# Patient Record
Sex: FEMALE | Race: WHITE | Employment: FULL TIME | URBAN - METROPOLITAN AREA
[De-identification: names, ages, dates, MRNs, and addresses within clinical notes are randomized per-mention and may not be internally consistent; named-entity substitution may affect disease eponyms.]

---

## 2019-02-25 ENCOUNTER — HOSPITAL ENCOUNTER (EMERGENCY)
Age: 24
Discharge: HOME | End: 2019-02-25
Attending: EMERGENCY MEDICINE
Payer: COMMERCIAL

## 2019-02-25 VITALS
TEMPERATURE: 98 F | SYSTOLIC BLOOD PRESSURE: 135 MMHG | DIASTOLIC BLOOD PRESSURE: 83 MMHG | BODY MASS INDEX: 35.88 KG/M2 | HEART RATE: 95 BPM | WEIGHT: 195 LBS | OXYGEN SATURATION: 99 % | HEIGHT: 62 IN | RESPIRATION RATE: 16 BRPM

## 2019-02-25 DIAGNOSIS — N39.0 URINARY TRACT INFECTION WITHOUT HEMATURIA, SITE UNSPECIFIED: Primary | ICD-10-CM

## 2019-02-25 LAB
-: ABNORMAL
AMORPHOUS: ABNORMAL
BACTERIA: ABNORMAL
BILIRUBIN URINE: ABNORMAL
CASTS UA: ABNORMAL /LPF
COLOR: ABNORMAL
COMMENT UA: ABNORMAL
CRYSTALS, UA: ABNORMAL /HPF
EPITHELIAL CELLS UA: ABNORMAL /HPF (ref 0–5)
GLUCOSE URINE: ABNORMAL
HCG(URINE) PREGNANCY TEST: NEGATIVE
KETONES, URINE: ABNORMAL
LEUKOCYTE ESTERASE, URINE: NEGATIVE
MUCUS: ABNORMAL
NITRITE, URINE: POSITIVE
OTHER OBSERVATIONS UA: ABNORMAL
PH UA: 5 (ref 5–8)
PROTEIN UA: ABNORMAL
RBC UA: ABNORMAL /HPF (ref 0–2)
RENAL EPITHELIAL, UA: ABNORMAL /HPF
SPECIFIC GRAVITY UA: 1.02 (ref 1–1.03)
TRICHOMONAS: ABNORMAL
TURBIDITY: CLEAR
URINE HGB: NEGATIVE
UROBILINOGEN, URINE: ABNORMAL
WBC UA: ABNORMAL /HPF (ref 0–5)
YEAST: ABNORMAL

## 2019-02-25 PROCEDURE — 84703 CHORIONIC GONADOTROPIN ASSAY: CPT

## 2019-02-25 PROCEDURE — 87086 URINE CULTURE/COLONY COUNT: CPT

## 2019-02-25 PROCEDURE — 99283 EMERGENCY DEPT VISIT LOW MDM: CPT

## 2019-02-25 PROCEDURE — 81001 URINALYSIS AUTO W/SCOPE: CPT

## 2019-02-25 RX ORDER — TRAZODONE HYDROCHLORIDE 50 MG/1
100 TABLET ORAL NIGHTLY
COMMUNITY
End: 2021-05-03

## 2019-02-25 RX ORDER — NITROFURANTOIN 25; 75 MG/1; MG/1
100 CAPSULE ORAL 2 TIMES DAILY
Qty: 20 CAPSULE | Refills: 0 | Status: SHIPPED | OUTPATIENT
Start: 2019-02-25 | End: 2019-03-07

## 2019-02-25 ASSESSMENT — ENCOUNTER SYMPTOMS
ABDOMINAL PAIN: 0
VOMITING: 0
COUGH: 0
SHORTNESS OF BREATH: 0
NAUSEA: 0
SORE THROAT: 0
BACK PAIN: 0

## 2019-02-26 LAB
CULTURE: NORMAL
Lab: NORMAL
SPECIMEN DESCRIPTION: NORMAL

## 2019-12-30 ENCOUNTER — HOSPITAL ENCOUNTER (EMERGENCY)
Age: 24
Discharge: HOME | End: 2019-12-30
Attending: EMERGENCY MEDICINE
Payer: COMMERCIAL

## 2019-12-30 VITALS
RESPIRATION RATE: 16 BRPM | HEART RATE: 91 BPM | DIASTOLIC BLOOD PRESSURE: 92 MMHG | HEIGHT: 62 IN | BODY MASS INDEX: 36.8 KG/M2 | TEMPERATURE: 98.1 F | WEIGHT: 200 LBS | SYSTOLIC BLOOD PRESSURE: 141 MMHG | OXYGEN SATURATION: 100 %

## 2019-12-30 PROCEDURE — 99283 EMERGENCY DEPT VISIT LOW MDM: CPT

## 2019-12-30 RX ORDER — CEPHALEXIN 250 MG/1
250 CAPSULE ORAL 3 TIMES DAILY
Qty: 30 CAPSULE | Refills: 0 | Status: SHIPPED | OUTPATIENT
Start: 2019-12-30 | End: 2020-07-22 | Stop reason: ALTCHOICE

## 2019-12-30 ASSESSMENT — PAIN DESCRIPTION - PAIN TYPE: TYPE: ACUTE PAIN

## 2019-12-30 ASSESSMENT — PAIN SCALES - GENERAL: PAINLEVEL_OUTOF10: 3

## 2019-12-30 ASSESSMENT — PAIN DESCRIPTION - ORIENTATION: ORIENTATION: LEFT

## 2019-12-30 ASSESSMENT — PAIN DESCRIPTION - LOCATION: LOCATION: EAR

## 2019-12-30 NOTE — ED PROVIDER NOTES
never smoked. She uses smokeless tobacco. She reports that she does not drink alcohol or use drugs. PHYSICAL EXAM     INITIAL VITALS:  height is 5' 2\" (1.575 m) and weight is 90.7 kg (200 lb). Her oral temperature is 98.1 °F (36.7 °C). Her blood pressure is 141/92 (abnormal) and her pulse is 91. Her respiration is 16 and oxygen saturation is 100%. Constitutional: The patient is alert, well-developed, in no acute distress. Vital signs as noted. Eyes: Pupils equal and reactive to light. EOMI. Ears, nose, and throat: Oropharynx clear without masses, lesions or deformities. Right TM demonstrates some fluid behind the eardrum but no redness or bulging. The left TM is mildly reddened and also shows some fluid behind the eardrum. EACs are normal.  Neck: No masses, trachea midline. DIFFERENTIAL DIAGNOSIS/ MEDICAL DECISION MAKING:      The ears are irrigated free of any wax in the patient's hearing is returned to normal levels. CT brain is negative for acute process. Follow Exit Care instructions closely. I have reviewed the disposition diagnosis with the patient and or their family/guardian. I have answered their questions and given discharge instructions. They voiced understanding of these instructions and did not have any further questions or complaints. DIAGNOSTIC RESULTS     RADIOLOGY:   Non-plain film images such as CT, Ultrasound and MRI are read by the radiologist. Plain radiographic images are visualized and preliminarily interpreted by the emergency physician with the below findings:  No orders to display         LABS:  No results found for this visit on 12/30/19.     ABNORMAL LABS:  Labs Reviewed - No data to display           EMERGENCY DEPARTMENT COURSE:   Vitals:    Vitals:    12/30/19 1845   BP: (!) 141/92   Pulse: 91   Resp: 16   Temp: 98.1 °F (36.7 °C)   TempSrc: Oral   SpO2: 100%   Weight: 90.7 kg (200 lb)   Height: 5' 2\" (1.575 m)     -------------------------  BP: (!) 141/92, Temp: 98.1 °F (36.7 °C), Pulse: 91, Resp: 16    See DDX/MDM  (Differential Diagnosis/Medical Decision Making) above. FINAL IMPRESSION      1. Non-recurrent acute suppurative otitis media of left ear without spontaneous rupture of tympanic membrane    2.  Dysfunction of Eustachian tube, unspecified laterality          DISPOSITION/PLAN   DISPOSITION Decision To Discharge 12/30/2019 06:55:45 PM      Condition on Disposition    Fair    PATIENT REFERRED TO:  MD Daniela Gasca\[facility] [place] [facility]\"" [address], [facility]  [phone]    In 2 days        DISCHARGE MEDICATIONS:  Discharge Medication List as of 12/30/2019  7:01 PM      START taking these medications    Details   cephALEXin (KEFLEX) 250 MG capsule Take 1 capsule by mouth 3 times daily, Disp-30 capsule, R-0Print             (Please note that portions of this note were completed with a voice recognition program.  Efforts were made to edit the dictations but occasionally words are mis-transcribed.)    Daisy Fontenot,, DO  Attending Emergency Physician       [address] [place] [address],   12/31/19 8090

## 2020-07-22 ENCOUNTER — HOSPITAL ENCOUNTER (EMERGENCY)
Age: 25
Discharge: HOME | End: 2020-07-22
Attending: EMERGENCY MEDICINE
Payer: COMMERCIAL

## 2020-07-22 VITALS
TEMPERATURE: 98.2 F | OXYGEN SATURATION: 99 % | WEIGHT: 200 LBS | DIASTOLIC BLOOD PRESSURE: 74 MMHG | SYSTOLIC BLOOD PRESSURE: 114 MMHG | HEIGHT: 62 IN | BODY MASS INDEX: 36.8 KG/M2 | HEART RATE: 83 BPM | RESPIRATION RATE: 14 BRPM

## 2020-07-22 PROCEDURE — 99282 EMERGENCY DEPT VISIT SF MDM: CPT

## 2020-07-22 PROCEDURE — 69200 CLEAR OUTER EAR CANAL: CPT

## 2020-07-22 RX ORDER — ARIPIPRAZOLE 15 MG/1
15 TABLET ORAL DAILY
COMMUNITY
End: 2021-05-03 | Stop reason: ALTCHOICE

## 2020-07-22 RX ORDER — SERTRALINE HYDROCHLORIDE 25 MG/1
25 TABLET, FILM COATED ORAL DAILY
COMMUNITY
End: 2021-05-08 | Stop reason: ALTCHOICE

## 2020-07-22 ASSESSMENT — PAIN - FUNCTIONAL ASSESSMENT: PAIN_FUNCTIONAL_ASSESSMENT: ACTIVITIES ARE NOT PREVENTED

## 2020-07-22 ASSESSMENT — PAIN DESCRIPTION - ONSET: ONSET: GRADUAL

## 2020-07-22 ASSESSMENT — PAIN DESCRIPTION - ORIENTATION: ORIENTATION: LEFT

## 2020-07-22 ASSESSMENT — PAIN SCALES - GENERAL: PAINLEVEL_OUTOF10: 2

## 2020-07-22 ASSESSMENT — PAIN DESCRIPTION - LOCATION: LOCATION: EAR

## 2020-07-22 ASSESSMENT — PAIN DESCRIPTION - PAIN TYPE: TYPE: ACUTE PAIN

## 2020-07-22 ASSESSMENT — PAIN DESCRIPTION - PROGRESSION: CLINICAL_PROGRESSION: NOT CHANGED

## 2020-07-22 ASSESSMENT — PAIN DESCRIPTION - FREQUENCY: FREQUENCY: INTERMITTENT

## 2020-07-22 NOTE — ED PROVIDER NOTES
[address] [facility] ED  [address] [facility] [address]  Phone: [phone]  Fax: [phone]      Pt Name: Rosey Mandel  [id number]  [place] 1995  Date of evaluation: 7/22/2020      CHIEF COMPLAINT       Chief Complaint   Patient presents with    Foreign Body in Ear     Ear plug stuck in left ear. Attempted to remove but unsuccessful. HISTORY OF PRESENT ILLNESS   Rosey Mandel is a 22 y.o. female who presents for evaluation of an ear plug stuck in her left ear. The patient reports that she placed a silicone ear plug in her left ear around 1:30 AM.  She states that she states that she has been unable to remove the ear plug and has attempted to pull it out with tweezers but it ultimately pushed back further and broken apart into pieces. She states that her brother is a nurse and tried to pull it out with hemostats but was unsuccessful. The patient complains of decreased hearing to the left ear and slight dizziness. She denies any drainage from the left ear. The patient has not taken any medications for pain and does not list any provoking or palliating factors. She denies any previous surgery to the left ear. The patient denies fever, chills, headache, vision changes, neck pain, back pain, chest pain, shortness of breath, abdominal pain, urinary/bowel symptoms, focal weakness, numbness, tingling, recent injury or illness. REVIEW OF SYSTEMS     Ten point review of systems was reviewed and is negative unless otherwise noted in the HPI    [address]    has a past medical history of Acid reflux, Anemia, Depression, Irritable bowel syndrome, and Kidney stone. SURGICAL HISTORY      has no past surgical history on file.   Patient denies    CURRENT MEDICATIONS       Previous Medications    ARIPIPRAZOLE (ABILIFY) 15 MG TABLET    Take 15 mg by mouth daily    BUSPIRONE HCL PO    Take by mouth 3 times daily    MIRABEGRON (MYRBETRIQ) 50 MG TB24    Take 50 mg by mouth daily SERTRALINE (ZOLOFT) 25 MG TABLET    Take 25 mg by mouth daily    SERTRALINE (ZOLOFT) 50 MG TABLET    Take 50 mg by mouth daily    TRAZODONE (DESYREL) 50 MG TABLET    Take 100 mg by mouth nightly        ALLERGIES     is allergic to bactrim [sulfamethoxazole-trimethoprim]. FAMILY HISTORY     She indicated that her mother is alive. She indicated that her father is alive. She indicated that the status of her maternal grandmother is unknown. She indicated that the status of her paternal grandfather is unknown.     family history includes Arthritis in her mother; Cancer in her paternal grandfather; Diabetes in her father and maternal grandmother. SOCIAL HISTORY      reports that she has never smoked. Her smokeless tobacco use includes chew. She reports that she does not drink alcohol or use drugs. I counseled the patient against using tobacco products. PHYSICAL EXAM     INITIAL VITALS:  height is 5' 2\" (1.575 m) and weight is 90.7 kg (200 lb). Her oral temperature is 98.2 °F (36.8 °C). Her blood pressure is 114/74 and her pulse is 83. Her respiration is 14 and oxygen saturation is 99%. CONSTITUTIONAL: no apparent distress, well appearing  SKIN: warm, dry, no jaundice, hives or petechiae  EYES: clear conjunctiva, non-icteric sclera  HENT: normocephalic, atraumatic, moist mucus membranes, there is a blue silicone earplug lodged deeply in the left ear canal that is pushing up against the tympanic membrane. There are small abrasions noted within the left ear canal without any active bleeding. Once the foreign body was removed the left TM was visualized and is intact without signs of infection. The right external ear canal and TM appear normal  NECK: Nontender and supple with no nuchal rigidity, full range of motion  PULMONARY: clear to auscultation without wheezes, rhonchi, or rales, normal excursion, no accessory muscle use and no stridor  CARDIOVASCULAR: regular rate, rhythm.  Strong radial pulses with intact distal perfusion. Capillary refill <2 seconds. GASTROINTESTINAL: soft, non-tender, non-distended, no palpable masses, no rebound or guarding   GENITOURINARY: No costovertebral angle tenderness to palpation  MUSCULOSKELETAL: No midline spinal tenderness, step off or deformity. Extremities are otherwise nontender to palpation and nonerythematous. Compartments soft. No peripheral edema. NEUROLOGIC: alert and oriented x 3, GCS 15, normal mentation and speech. Moves all extremities x 4 without motor or sensory deficit, gait is stable without ataxia  PSYCHIATRIC: normal mood and affect, thought process is clear and linear    DIAGNOSTIC RESULTS     EKG:  None    RADIOLOGY:   No results found. LABS:  No results found for this visit on 07/22/20. EMERGENCY DEPARTMENT COURSE:        The patient was given the following medications:  No orders of the defined types were placed in this encounter. Vitals:    Vitals:    07/22/20 0316   BP: 114/74   Pulse: 83   Resp: 14   Temp: 98.2 °F (36.8 °C)   TempSrc: Oral   SpO2: 99%   Weight: 90.7 kg (200 lb)   Height: 5' 2\" (1.575 m)     -------------------------  BP: 114/74, Temp: 98.2 °F (36.8 °C), Pulse: 83, Resp: 14    CONSULTS:  None    CRITICAL CARE:   None    PROCEDURES:  Ear Foreign Body Removal Procedure Note    Indication: foreign body in the left ear canal                   Procedure: During otoscopic evaluation a foreign body was visualized in the left ear which appeared to be blue silicon. The ear canal was anesthetized using tetracaine ophthalmic drops. The patient's head was positioned appropriately and the foreign body was removed in pieces using alligator forceps, sterile water flush, suction, and a scooping tool. The patient tolerated the procedure poorly and was delayed multiple times at the patient's request because she could not sit still.      Complications: mild external canal trauma      DIAGNOSIS/ MDM:   Erich Latham is a 22 y.o. female who presents with a ear plug in her left ear. Vital signs are stable. Heart regular rate and rhythm. Lungs clear to auscultation. Abdomen soft nontender. Right ear canal and TM appear normal.  Left ear canal shows signs of trauma with small abrasions. There was a silicone ear plug that was in small pieces within the left ear canal pushing up against the tympanic membrane. The patient delayed removing the foreign body multiple times because she cannot sit still. Multiple tools were eventually successful in removing all of the pieces. The left tympanic membrane is intact. No signs of infection or perforation. I instructed the patient to take ibuprofen or Tylenol as needed for pain and to follow-up with her PCP in 1 to 2 days for recheck. She was instructed to return to the ED for worsening symptoms or any other concern. The patient understands that at this time there is no evidence for a more malignant underlying process, but also understands that early in the process of an illness or injury, and emergency department work-up can be falsely reassuring. Routine discharge counseling was given, and the patient understands that worsening, changing or persistent symptoms should prompt a immediate call or follow-up with their primary care physician or return to the emergency department. The importance of appropriate follow-up was also discussed. I have reviewed the disposition diagnosis with the patient. I have answered their questions and given discharge instructions. They voiced understanding of these instructions and did not have any further questions or complaints. FINAL IMPRESSION      1.  Foreign body of left ear, initial encounter          DISPOSITION/PLAN   DISPOSITION          PATIENT REFERRED TO:  Wilburn Seip, [address] [place] [address],[address] [place] [address]  [phone]    Schedule an appointment as soon as possible for a visit in 2 days      [facility] ED  [address]

## 2020-11-11 ENCOUNTER — HOSPITAL ENCOUNTER (OUTPATIENT)
Age: 25
Setting detail: SPECIMEN
Discharge: HOME | End: 2020-11-11
Payer: COMMERCIAL

## 2020-11-11 ENCOUNTER — OFFICE VISIT (OUTPATIENT)
Dept: PRIMARY CARE CLINIC | Age: 25
End: 2020-11-11
Payer: COMMERCIAL

## 2020-11-11 VITALS
DIASTOLIC BLOOD PRESSURE: 66 MMHG | OXYGEN SATURATION: 98 % | SYSTOLIC BLOOD PRESSURE: 100 MMHG | HEIGHT: 62 IN | BODY MASS INDEX: 36.8 KG/M2 | RESPIRATION RATE: 16 BRPM | TEMPERATURE: 97.9 F | WEIGHT: 200 LBS | HEART RATE: 77 BPM

## 2020-11-11 PROCEDURE — 99214 OFFICE O/P EST MOD 30 MIN: CPT | Performed by: NURSE PRACTITIONER

## 2020-11-11 ASSESSMENT — PATIENT HEALTH QUESTIONNAIRE - PHQ9
SUM OF ALL RESPONSES TO PHQ QUESTIONS 1-9: 0
1. LITTLE INTEREST OR PLEASURE IN DOING THINGS: 0
SUM OF ALL RESPONSES TO PHQ QUESTIONS 1-9: 0
SUM OF ALL RESPONSES TO PHQ QUESTIONS 1-9: 0
2. FEELING DOWN, DEPRESSED OR HOPELESS: 0
SUM OF ALL RESPONSES TO PHQ9 QUESTIONS 1 & 2: 0

## 2020-11-11 NOTE — PATIENT INSTRUCTIONS
You were tested for COVID today. We will call you with results when they are available. If you have not heard from us in 7 days, please call our office. Patient was evaluated [place] today during this pandemic covid 19 situation. Quarantine as directed  Await results  Increase fluids- stay hydrated  Good handwashing  Supportive care as discussed    If having symptoms- you need to be fever free for 24 hours, other symptoms resolved and at least 10 days passed since first symptom appeared before discontinuing  quarantine- CDC guidelines    tylenol as directed as needed over the counter if able to take  go to the ER for chest pain, short of breath, hard time breathing, persistent vomiting, feeling weaker or dehydrated, any worsening, change or concern  Follow up with primary care for re evaluation  [place] covid 19 call center - [phone]- [phone]  Another good resource for information is coronavirus. [place].gov    If exposure, it is recommended 14 day quarantine    The COVID-19 test that was done today can take 1-6 days for results. Until then you should assume you have this disease and adhere to home isolation as described below. When we get the test results back, one of the following readings will be obtained. 1. A positive test means you have the virus. 2.  An inconclusive test means it wasn't sure if you have the virus or not. An inconclusive test result is treated as a positive result and recommendations  are the same as a positive test result. We may ask you to repeat this test in this circumstance. 3.  A negative test means you probably do not have the virus.       Prevention steps for People with positive or inconclusive test results or suspected  COVID-19 (including persons under investigation) who do not need to be hospitalized  and   People with confirmed COVID-19 who were hospitalized and determined to be medically stable to go home    Contacts who are NOT healthcare providers or first responders and are asymptomatic (no fever,  cough, shortness of breath, or difficulty breathing) should self-quarantine for 14 days from the last  date of exposure to confirmed or suspected COVID-19. Your healthcare provider and public health staff will evaluate whether you can be cared for at home. If it is determined that you do not need to be hospitalized and can be isolated at home, you will be monitored by staff from your health department. You should follow the prevention steps below until a healthcare provider or local or state health department says you can return to your normal activities. Stay home except to get medical care  People who are mildly ill with COVID-19 are able to isolate at home during their illness. You should restrict activities outside your home, except for getting medical care. Do not go to work, school, or public areas. Avoid using public transportation, ride-sharing, or taxis. Separate yourself from other people and animals in your home  People: As much as possible, you should stay in a specific room and away from other people in your home. Also, you should use a separate bathroom, if available. Animals: You should restrict contact with pets and other animals while you are sick with COVID-19, just like you would around other people. Although there have not been reports of pets or other animals becoming sick with COVID-19, it is still recommended that people sick with COVID-19 limit contact with animals until more information is known about the virus. When possible, have another member of your household care for your animals while you are sick. If you are sick with COVID-19, avoid contact with your pet, including petting, snuggling, being kissed or licked, and sharing food. If you must care for your pet or be around animals while you are sick, wash your hands before and after you interact with pets and wear a facemask.   Call ahead before visiting your doctor  If you have a medical appointment, call the healthcare provider and tell them that you have or may have COVID-19. This will help the healthcare providers office take steps to keep other people from getting infected or exposed. Wear a facemask  You should wear a facemask when you are around other people (e.g., sharing a room or vehicle) or pets and before you enter a healthcare providers office. If you are not able to wear a facemask (for example, because it causes trouble breathing), then people who live with you should not stay in the same room with you; they should also wear a facemask if they enter your room. Cover your coughs and sneezes  Cover your mouth and nose with a tissue when you cough or sneeze. Throw used tissues in a lined trash can. Immediately wash your hands with soap and water for at least 20 seconds or, if soap and water are not available, clean your hands with an alcohol-based hand  that contains at least 60% alcohol. Clean your hands often  Wash your hands often with soap and water for at least 20 seconds, especially after blowing your nose, coughing, or sneezing; going to the bathroom; and before eating or preparing food. If soap and water are not readily available, use an alcohol-based hand  with at least 60% alcohol, covering all surfaces of your hands and rubbing them together until they feel dry. Soap and water are the best option if hands are visibly dirty. Avoid touching your eyes, nose, and mouth with unwashed hands. Avoid sharing personal household items  You should not share dishes, drinking glasses, cups, eating utensils, towels, or bedding with other people or pets in your home. After using these items, they should be washed thoroughly with soap and water. Clean all high-touch surfaces everyday  High touch surfaces include counters, tabletops, doorknobs, bathroom fixtures, toilets, phones, keyboards, tablets, and bedside tables.  Also, clean any surfaces that may have blood, stool, or body fluids on them. Use a household cleaning spray or wipe, according to the label instructions. Labels contain instructions for safe and effective use of the cleaning product including precautions you should take when applying the product, such as wearing gloves and making sure you have good ventilation during use of the product. Monitor your symptoms  Seek prompt medical attention if your illness is worsening (e.g., difficulty breathing). Before seeking care, call your healthcare provider and tell them that you have, or are being evaluated for, COVID-19. Put on a facemask before you enter the facility. These steps will help the healthcare providers office to keep other people in the office or waiting room from getting infected or exposed. Persons who are placed under active monitoring or facilitated self-monitoring should follow instructions provided by their local health department or occupational health professionals, as appropriate. When working with your local health department check their available hours. If you have a medical emergency and need to call 911, notify the dispatch personnel that you have, or are being evaluated for COVID-19. If possible, put on a facemask before emergency medical services arrive. Discontinuing home isolation  Patients with confirmed COVID-19 should remain under home isolation precautions until the risk of secondary transmission to others is thought to be low. The decision to discontinue home isolation precautions should be made on a case-by-case basis, in consultation with your physician and the health department. Please do NOT make this decision on your own. If your results of the COVID-19 test is NEGATIVE -     The patient may stop isolation, in consultation with your health care provider, typically when: Your healthcare provider has determined that the cause of the illness is NOT COVID-19 and approves your return to work.   OR  Ten (10) days have passed since onset of symptoms AND three days (72 hours) have passed with no fever without taking medication (like Tylenol) to reduce fever,  respiratory symptoms have resolved and you have been evaluated by your health care provider. Please follow up with your physician for evaluation about this. The following websites are the best places for up to date information on this fluid situation. https://coronavirus. [place].gov/wps/portal/gov/covid-19/home/local-health-districts-and-providers/guidance-for-covid-19-exposure-management    Preventing the Spread of Coronavirus Disease 2019 in Homes and Residential Communities     For the most recent information go to [employer]  https://coronavirus. [place].gov/wps/portal/gov/covid-19/home/local-health-districts-and-providers/guidance-for-covid-19-exposure-management

## 2020-11-11 NOTE — PROGRESS NOTES
Pt is here for fever, SOB, and cough. Sx started 1 week ago. Highest reported fever was 100.4 on Monday. [facility]  [facility]  [address] [id number]  Dept: [phone]  Dept Fax: [phone]    Iain Lechuga is a 22 y.o. female who presents to the urgent care today for her medical conditions/complaints as noted below. Iain Lechuga is c/o of Cough; Fever; and Shortness of Breath      HPI:     HPI    Past Medical History:   Diagnosis Date    Acid reflux     Anemia     Depression     Irritable bowel syndrome     Kidney stone        Current Outpatient Medications   Medication Sig Dispense Refill    sertraline (ZOLOFT) 25 MG tablet Take 25 mg by mouth daily      ARIPiprazole (ABILIFY) 15 MG tablet Take 15 mg by mouth daily      mirabegron (MYRBETRIQ) 50 MG TB24 Take 50 mg by mouth daily      sertraline (ZOLOFT) 50 MG tablet Take 50 mg by mouth daily      BUSPIRONE HCL PO Take by mouth 3 times daily      traZODone (DESYREL) 50 MG tablet Take 100 mg by mouth nightly        No current facility-administered medications for this visit. Allergies   Allergen Reactions    Sulfamethoxazole-Trimethoprim Hives     Other reaction(s): Unknown       :     Review of Systems   Constitutional: Positive for fatigue and fever. HENT: Positive for congestion, postnasal drip, rhinorrhea and sore throat. Eyes: Negative. Respiratory: Positive for cough and shortness of breath. Negative for chest tightness, wheezing and stridor. Cardiovascular: Negative. Negative for chest pain. Gastrointestinal: Negative. Musculoskeletal: Positive for myalgias. Skin: Negative. Allergic/Immunologic: Positive for environmental allergies. Neurological: Positive for headaches. All other systems reviewed and are negative.      :     Physical Exam  Vitals signs and nursing note reviewed. Constitutional:       Appearance: Normal appearance.    HENT: Right Ear: External ear normal.      Left Ear: External ear normal.      Nose: Congestion and rhinorrhea present. Mouth/Throat:      Mouth: Mucous membranes are moist.      Pharynx: Posterior oropharyngeal erythema present. No oropharyngeal exudate. Eyes:      Extraocular Movements: Extraocular movements intact. Conjunctiva/sclera: Conjunctivae normal.      Pupils: Pupils are equal, round, and reactive to light. Neck:      Musculoskeletal: Normal range of motion. Cardiovascular:      Rate and Rhythm: Normal rate and regular rhythm. Pulses: Normal pulses. Heart sounds: Normal heart sounds. Pulmonary:      Effort: Pulmonary effort is normal. No respiratory distress. Breath sounds: Normal breath sounds. No stridor. No wheezing. Abdominal:      Tenderness: There is no abdominal tenderness. Musculoskeletal: Normal range of motion. Skin:     General: Skin is warm and dry. Capillary Refill: Capillary refill takes less than 2 seconds. Neurological:      Mental Status: She is alert. /66 (Site: Left Upper Arm, Position: Sitting)   Pulse 77   Temp 97.9 °F (36.6 °C) (Temporal)   Resp 16   Ht 5' 2\" (1.575 m)   Wt 200 lb (90.7 kg)   SpO2 98%   BMI 36.58 kg/m²     :       Diagnosis Orders   1. Suspected COVID-19 virus infection  Covid-19 Ambulatory       :      Return if symptoms worsen or fail to improve. No orders of the defined types were placed in this encounter. Patient given educational materials - see patient instructions. Discussed use, benefit, and side effects of prescribed medications. All patient questions answered. Pt voiced understanding.     Electronically signed by HUGO Mcmanus 11/13/2020 at 9:38 AM

## 2020-11-13 ASSESSMENT — ENCOUNTER SYMPTOMS
SHORTNESS OF BREATH: 1
WHEEZING: 0
COUGH: 1
SORE THROAT: 1
STRIDOR: 0
EYES NEGATIVE: 1
GASTROINTESTINAL NEGATIVE: 1
RHINORRHEA: 1
CHEST TIGHTNESS: 0

## 2020-11-17 LAB — SARS-COV-2, NAA: NOT DETECTED

## 2021-01-06 ENCOUNTER — HOSPITAL ENCOUNTER (EMERGENCY)
Age: 26
Discharge: HOME | End: 2021-01-06
Attending: EMERGENCY MEDICINE
Payer: COMMERCIAL

## 2021-01-06 VITALS
HEART RATE: 79 BPM | DIASTOLIC BLOOD PRESSURE: 90 MMHG | WEIGHT: 210 LBS | OXYGEN SATURATION: 99 % | HEIGHT: 65 IN | RESPIRATION RATE: 18 BRPM | TEMPERATURE: 98.1 F | BODY MASS INDEX: 34.99 KG/M2 | SYSTOLIC BLOOD PRESSURE: 132 MMHG

## 2021-01-06 DIAGNOSIS — B34.9 ACUTE VIRAL SYNDROME: Primary | ICD-10-CM

## 2021-01-06 LAB
BILIRUBIN URINE: NEGATIVE
COLOR: YELLOW
COMMENT UA: NORMAL
GLUCOSE URINE: NEGATIVE
HCG(URINE) PREGNANCY TEST: NEGATIVE
KETONES, URINE: NEGATIVE
LEUKOCYTE ESTERASE, URINE: NEGATIVE
NITRITE, URINE: NEGATIVE
PH UA: 5.5 (ref 5–8)
PROTEIN UA: NEGATIVE
SPECIFIC GRAVITY UA: 1.01 (ref 1–1.03)
TURBIDITY: CLEAR
URINE HGB: NEGATIVE
UROBILINOGEN, URINE: NORMAL

## 2021-01-06 PROCEDURE — 81003 URINALYSIS AUTO W/O SCOPE: CPT

## 2021-01-06 PROCEDURE — 6360000002 HC RX W HCPCS: Performed by: EMERGENCY MEDICINE

## 2021-01-06 PROCEDURE — 99284 EMERGENCY DEPT VISIT MOD MDM: CPT

## 2021-01-06 PROCEDURE — 81025 URINE PREGNANCY TEST: CPT

## 2021-01-06 RX ORDER — DEXAMETHASONE 4 MG/1
12 TABLET ORAL ONCE
Status: COMPLETED | OUTPATIENT
Start: 2021-01-06 | End: 2021-01-06

## 2021-01-06 RX ADMIN — DEXAMETHASONE 12 MG: 4 TABLET ORAL at 10:08

## 2021-01-06 ASSESSMENT — ENCOUNTER SYMPTOMS
SORE THROAT: 0
COUGH: 1
SHORTNESS OF BREATH: 0
VOMITING: 1
DIARRHEA: 0

## 2021-01-06 ASSESSMENT — PAIN SCALES - GENERAL: PAINLEVEL_OUTOF10: 5

## 2021-01-06 NOTE — ED PROVIDER NOTES
[id number] [facility] ED  [address] [address] [facility] [place] [address]  Phone: [phone]  Fax: [phone] [facility]  [place]      Pt Name: Bessie Chavez  MRN: [id number]  [place] 1995  Date of evaluation: 1/6/2021  Provider: Nic King, [address] [place] [address]       Chief Complaint   Patient presents with    Pharyngitis     pt c/o fatige, cough, bloody sputum, headache and decreased sensation of taste and smell x 4 days. pt is wanting a covid test today.  Cough    Fatigue         HISTORY OF PRESENT ILLNESS   (Location/Symptom, Timing/Onset,Context/Setting, Quality, Duration, Modifying Factors, Severity)  Note limiting factors. Bessie Chavez is a 22 y.o. female who presents to the emergency department for the evaluation of upper respiratory symptoms. She is having a sore throat, she is also had a cough and extreme fatigue. She states her cough is intermittently productive and she did see some blood-tinged sputum. She states she specifically would like Covid testing and she needs a stat result. Patient also admits that she had unprotected intercourse on December 19 and took a Plan B on December 20 and she is had some intermittent spotting since that time and has some concern for pregnancy despite 2 - pregnancy tests at home. She was prompted to come to the emergency department today when she had 2 episodes of vomiting in addition to her other symptoms. She denies abdominal pain. She is not currently nauseous or vomiting    Nursing Notes were reviewed. REVIEW OF SYSTEMS    (2-9systems for level 4, 10 or more for level 5)     Review of Systems   Constitutional: Positive for fatigue. Negative for fever. HENT: Negative for sore throat. Respiratory: Positive for cough. Negative for shortness of breath. Cardiovascular: Negative for chest pain. Gastrointestinal: Positive for vomiting. Negative for diarrhea.    Genitourinary: Negative Talks on phone: None     Gets together: None     Attends Advent service: None     Active member of club or organization: None     Attends meetings of clubs or organizations: None     Relationship status: None    Intimate partner violence     Fear of current or ex partner: None     Emotionally abused: None     Physically abused: None     Forced sexual activity: None   Other Topics Concern    None   Social History Narrative    None       SCREENINGS    [place] Coma Scale  Eye Opening: Spontaneous  Best Verbal Response: Oriented  Best Motor Response: Obeys commands  Edel Coma Scale Score: 15        PHYSICAL EXAM    (up to 7 for level 4, 8 or more for level 5)     ED Triage Vitals [01/06/21 0932]   BP Temp Temp Source Pulse Resp SpO2 Height Weight   (!) 132/90 98.1 °F (36.7 °C) Oral 79 18 99 % 5' 5\" (1.651 m) 210 lb (95.3 kg)       Physical Exam  Vitals signs and nursing note reviewed. Constitutional:       General: She is not in acute distress. Appearance: Normal appearance. She is not ill-appearing or toxic-appearing. HENT:      Head: Normocephalic and atraumatic. Nose: Nose normal. No congestion. Mouth/Throat:      Mouth: Mucous membranes are moist.   Eyes:      General:         Right eye: No discharge. Left eye: No discharge. Conjunctiva/sclera: Conjunctivae normal.   Neck:      Musculoskeletal: Normal range of motion. Cardiovascular:      Rate and Rhythm: Normal rate and regular rhythm. Pulses: Normal pulses. Heart sounds: Normal heart sounds. No murmur. Pulmonary:      Effort: Pulmonary effort is normal. No respiratory distress. Breath sounds: Normal breath sounds. No wheezing. Abdominal:      General: Abdomen is flat. There is no distension. Palpations: Abdomen is soft. Tenderness: There is no abdominal tenderness. Musculoskeletal: Normal range of motion. General: No deformity or signs of injury.    Skin:     General: Skin is warm and concerning symptoms. Patient demonstrates understanding.    [TS]      ED Course User Index  [TS] Rickie Eastman DO         PROCEDURES:  Unless otherwise noted below, none     Procedures    FINAL IMPRESSION      1.  Acute viral syndrome          DISPOSITION/PLAN   DISPOSITION Decision To Discharge 01/06/2021 10:21:22 AM      PATIENT REFERRED TO:  [address] [id number]  [phone]    In 1 week        DISCHARGE MEDICATIONS:  New Prescriptions    No medications on file          (Please note that portions of this note were completed with a voice recognition program.  Efforts were made to edit the dictations but occasionally words are mis-transcribed.)    Rickie Eastman DO (electronically signed)  Board Certified Emergency Physician         Rickie Eastman DO  01/06/21 1023

## 2021-01-06 NOTE — ED NOTES
[facility]      Glen Vega RN  01/06/21 1010

## 2021-01-06 NOTE — ED NOTES
Update per Dr Mackenzie Kim at bedside to discuss plan of care and results.      Mariama Phillips RN  01/06/21 1037

## 2021-01-06 NOTE — ED NOTES
Pt refused covid test due to needs a result today for work and states will go to a rapid test site after discharge.       Molly Cortez RN  01/06/21 4697

## 2021-01-07 ENCOUNTER — CARE COORDINATION (OUTPATIENT)
Dept: CARE COORDINATION | Age: 26
End: 2021-01-07

## 2021-01-07 NOTE — CARE COORDINATION
Patient contacted regarding [address] Discussed COVID-19 related testing which was not done at this time. Test results were not done. Patient informed of results, if available? Pt states had test done at the [facility] and it was Negative    Care Transition Nurse/ Ambulatory Care Manager contacted the patient by telephone to perform post discharge assessment. Call within 2 business days of discharge: Yes. Verified name and  with patient as identifiers. Provided introduction to self, and explanation of the CTN/ACM role, and reason for call due to risk factors for infection and/or exposure to COVID-19. Symptoms reviewed with patient who verbalized the following symptoms: cough and shortness of breath. Due to pt states she has virtual visit with PCP in 15 min due to worsening of symptoms encounter was not routed to provider for escalation. Discussed follow-up appointments. If no appointment was previously scheduled, appointment scheduling offered: [facility] follow up appointment(s): No future appointments. Non-[facility] follow up appointment(s):  Pt already has appt    Non-face-to-face services provided:  Obtained and reviewed discharge summary and/or continuity of care documents     Advance Care Planning:   Does patient have an Advance Directive:  reviewed and current. Patient has following risk factors of: no known risk factors. CTN/ACM reviewed discharge instructions, medical action plan and red flags such as increased shortness of breath, increasing fever and signs of decompensation with patient who verbalized understanding. Discussed exposure protocols and quarantine with CDC Guidelines What to do if you are sick with coronavirus disease 2019.  Patient was given an opportunity for questions and concerns.  The patient agrees to contact the [facility] exposure line [phone], [facility]: ([phone]) and PCP office for questions related to their healthcare. CTN/ACM provided contact information for future needs. Reviewed and educated patient on any new and changed medications related to discharge diagnosis     Patient/family/caregiver given information for GetWell Loop and agrees to enroll yes  Patient's preferred e-mail:   [email or web address]  Patient's preferred phone number: [phone]  Program: Active Symptoms   Based on Loop alert triggers, patient will be contacted by nurse care manager for worsening symptoms. Pt will be further monitored by COVID Loop Team based on severity of symptoms and risk factors.

## 2021-01-10 ENCOUNTER — CARE COORDINATION (OUTPATIENT)
Dept: CARE COORDINATION | Age: 26
End: 2021-01-10

## 2021-01-10 NOTE — CARE COORDINATION
Date/Time:  1/10/2021 9:48 AM  RN attempted to reach patient by telephone regarding yellow alert in Loop remote symptom monitoring program. Left HIPPA compliant message requesting a return call. Will attempt to reach patient again. Comment left in Loop monitoring program with contact information. Alyson Villalba, Good Morning and thank you for checking in with us this morning! How are you feeling today? I tried to call you and left a voicemail. Please let us know if your symptoms are worse than yesterday. Continue to get plenty of rest and drink lots of fluids. We are available 9-1 today. You can reach me at [phone].  Thank you- Deepika KAISER

## 2021-05-03 ENCOUNTER — HOSPITAL ENCOUNTER (OUTPATIENT)
Age: 26
Setting detail: SPECIMEN
Discharge: HOME | End: 2021-05-03
Payer: COMMERCIAL

## 2021-05-03 ENCOUNTER — OFFICE VISIT (OUTPATIENT)
Dept: OBGYN CLINIC | Age: 26
End: 2021-05-03
Payer: COMMERCIAL

## 2021-05-03 VITALS
WEIGHT: 221 LBS | BODY MASS INDEX: 36.78 KG/M2 | DIASTOLIC BLOOD PRESSURE: 70 MMHG | SYSTOLIC BLOOD PRESSURE: 114 MMHG | RESPIRATION RATE: 16 BRPM

## 2021-05-03 DIAGNOSIS — N94.6 DYSMENORRHEA: ICD-10-CM

## 2021-05-03 DIAGNOSIS — Z01.419 WOMEN'S ANNUAL ROUTINE GYNECOLOGICAL EXAMINATION: Primary | ICD-10-CM

## 2021-05-03 PROBLEM — R56.9 SEIZURE (HCC): Status: ACTIVE | Noted: 2021-05-03

## 2021-05-03 PROBLEM — F32.A DEPRESSION: Status: ACTIVE | Noted: 2021-05-03

## 2021-05-03 PROBLEM — F41.9 ANXIETY: Status: ACTIVE | Noted: 2021-05-03

## 2021-05-03 PROCEDURE — 99395 PREV VISIT EST AGE 18-39: CPT | Performed by: NURSE PRACTITIONER

## 2021-05-03 RX ORDER — TRAZODONE HYDROCHLORIDE 100 MG/1
TABLET ORAL
COMMUNITY
Start: 2021-04-20 | End: 2022-07-28

## 2021-05-03 RX ORDER — METFORMIN HYDROCHLORIDE 500 MG/1
TABLET, EXTENDED RELEASE ORAL
COMMUNITY
Start: 2021-03-13 | End: 2022-07-20

## 2021-05-03 RX ORDER — BUSPIRONE HYDROCHLORIDE 15 MG/1
TABLET ORAL
COMMUNITY
Start: 2021-01-26 | End: 2021-05-03 | Stop reason: ALTCHOICE

## 2021-05-03 RX ORDER — NORETHINDRONE ACETATE AND ETHINYL ESTRADIOL 1MG-20(21)
1 KIT ORAL DAILY
Qty: 1 PACKET | Refills: 3 | Status: SHIPPED | OUTPATIENT
Start: 2021-05-03 | End: 2022-01-25

## 2021-05-03 RX ORDER — LAMOTRIGINE 25 MG/1
TABLET ORAL
COMMUNITY
Start: 2021-04-20 | End: 2022-07-20

## 2021-05-03 RX ORDER — PROPRANOLOL HYDROCHLORIDE 10 MG/1
TABLET ORAL
COMMUNITY
Start: 2021-04-20 | End: 2022-07-28

## 2021-05-03 RX ORDER — LIRAGLUTIDE 6 MG/ML
INJECTION SUBCUTANEOUS
COMMUNITY
Start: 2021-04-12 | End: 2022-07-20

## 2021-05-03 RX ORDER — PEN NEEDLE, DIABETIC 32 GX 1/4"
NEEDLE, DISPOSABLE MISCELLANEOUS
COMMUNITY
Start: 2021-04-12 | End: 2022-07-20

## 2021-05-03 ASSESSMENT — ENCOUNTER SYMPTOMS
SHORTNESS OF BREATH: 0
DIARRHEA: 0
VOMITING: 0
COUGH: 0
ABDOMINAL PAIN: 0
CONSTIPATION: 0
NAUSEA: 0
BACK PAIN: 0
RHINORRHEA: 0
COLOR CHANGE: 0

## 2021-05-03 NOTE — PROGRESS NOTES
Gynecologic History:   A0  Menarche: 5 yo  Menopause at  yo     No LMP recorded. Sexually Active: Yes    STD History: No     Permanent Sterilization: No   Reversible Birth Control: No        Hormone Replacement Exposure: No      Family History of Breast, Ovarian , Colon or Uterine Cancer: Yes PGM Breast Ca-   If YES see scanned worksheet.     Preventative Health Testing:  Date of Last Pap Smear:   Abnormal Pap Smear History: No  Colposcopy History: No  Date of Last Mammogram: N/A  Date of Last Colonoscopy: 10 years- negative - recheck at 36  Date of Last Bone Density:N/A

## 2021-05-03 NOTE — PATIENT INSTRUCTIONS
Patient Education        Pap Test: Care Instructions  Overview     The Pap test (also called a Pap smear) is a screening test for cancer of the cervix, which is the lower part of the uterus that opens into the vagina. The test can help your doctor find early changes in the cells that could lead to cancer. The sample of cells taken during your test has been sent to a lab so that an expert can look at the cells. It usually takes a week or two to get the results back. Follow-up care is a key part of your treatment and safety. Be sure to make and go to all appointments, and call your doctor if you are having problems. It's also a good idea to know your test results and keep a list of the medicines you take. What do the results mean? · A normal result means that the test did not find any abnormal cells in the sample. · An abnormal result can mean many things. Most of these are not cancer. The results of your test may be abnormal because:  ? You have an infection of the vagina or cervix, such as a yeast infection. ? You have an IUD (intrauterine device for birth control). ? You have low estrogen levels after menopause that are causing the cells to change. ? You have cell changes that may be a sign of precancer or cancer. The results are ranked based on how serious the changes might be. There are many other reasons why you might not get a normal result. If the results were abnormal, you may need to get another test within a few weeks or months. If the results show changes that could be a sign of cancer, you may need a test called a colposcopy, which provides a more complete view of the cervix. Sometimes the lab cannot use the sample because it does not contain enough cells or was not preserved well. If so, you may need to have the test again. This is not common, but it does happen from time to time. When should you call for help?   Watch closely for changes in your health, and be sure to contact your doctor if:    · You have vaginal bleeding or pain for more than 2 days after the test. It is normal to have a small amount of bleeding for a day or two after the test.   Where can you learn more? Go to [email or web address].healthwise.io. org and sign in to your [employer] account. Enter J971 in the Xigen box to learn more about \"Pap Test: Care Instructions. \"     If you do not have an account, please click on the \"Sign Up Now\" link. Current as of: December 17, 2020               Content Version: 12.8  © 2622-9004 Healthwise, Incorporated. Care instructions adapted under license by [facility] ([facility]). If you have questions about a medical condition or this instruction, always ask your healthcare professional. [address] any warranty or liability for your use of this information.        Patient Education

## 2021-05-03 NOTE — PROGRESS NOTES
Asmita Isabel is a 22 y.o.  here for her annual exam.  The patient was seen and examined. The patients past medical, surgical, social and family history were reviewed. Current medications and allergies were reviewed, and documented in the chart. She is single. She is employed as      Exercise Yes  Diet Yes  Tobacco abuse chews tobacco- cessation encouarged    Last PAP: -negative, hx of abnormal PAP no  Family hx uterine or ovarian cancer-deneis   Family hx of breast cancer -PGM  family hx colon cancer -denies  HPV vaccine: 2 of 3 dosages      Sexually active: yes - has had 6 new partners in past year, states uses protection. ,  Dyspareunia: No, Vaginal discharge: no,  UTI symptoms: no, voiding difficulties: no, bowels regular:Yes bloating:no      Menstrual history:  Menarche age- 6, cycle every  28 days,  lasts 4-6 days. Birth control: condoms, would like to restart birth control. She has hx dysmenorrhea. She has been on [address] years ago. She denies personal or family hx of blood clots or clotting disorders. Denies hx of migraines with aura. She is currently on Lamictal for weight loss she states. LMP: 21    OB History    Para Term  AB Living   0 0 0 0 0 0   SAB TAB Ectopic Molar Multiple Live Births   0 0 0 0 0 0       Vitals:    21 1410   BP: 114/70   Site: Left Upper Arm   Position: Sitting   Cuff Size: Large Adult   Resp: 16   Weight: 221 lb (100.2 kg)       Wt Readings from Last 3 Encounters:   21 221 lb (100.2 kg)   21 210 lb (95.3 kg)   20 200 lb (90.7 kg)     Past Medical History:   Diagnosis Date    Acid reflux     Anemia     Bipolar 1 disorder (HCC)     Depression     Dysmenorrhea     Irritable bowel syndrome     Kidney stone                                                                    History reviewed. No pertinent surgical history.   Family History   Problem Relation Age of Onset    Arthritis Mother     Diabetes Father    Camacho Contreras and myalgias. Skin: Negative for color change and rash. Neurological: Negative for dizziness, light-headedness and headaches. Hematological: Negative for adenopathy. Does not bruise/bleed easily. Psychiatric/Behavioral: Negative for self-injury and suicidal ideas. Objective:     Physical Exam  Vitals signs and nursing note reviewed. Constitutional:       General: She is not in acute distress. Appearance: She is well-developed. She is not diaphoretic. HENT:      Head: Normocephalic and atraumatic. Right Ear: External ear normal.      Left Ear: External ear normal.      Nose: Nose normal.   Eyes:      Pupils: Pupils are equal, round, and reactive to light. Neck:      Musculoskeletal: Normal range of motion and neck supple. Thyroid: No thyromegaly. Cardiovascular:      Rate and Rhythm: Normal rate and regular rhythm. Heart sounds: Normal heart sounds. No murmur. No friction rub. No gallop. Comments: No bilateral calf tenderness or swelling  Pulmonary:      Effort: Pulmonary effort is normal. No respiratory distress. Breath sounds: Normal breath sounds. No wheezing. Abdominal:      General: Bowel sounds are normal.      Palpations: Abdomen is soft. Tenderness: There is no abdominal tenderness. Genitourinary:     Comments: Breasts nipples everted, no masses or tenderness, does BSE  Vulva-no lesions  Vagina-pink rugated  Cervix-firm, 2 cm. Nontender, freely movable, no lesions  Uterus-ant. Smooth, firm, nontender, freely movable  Adnexa-no masses or tenderness   Musculoskeletal: Normal range of motion. Lymphadenopathy:      Cervical: No cervical adenopathy. Skin:     General: Skin is warm and dry. Findings: No rash. Neurological:      Mental Status: She is alert and oriented to person, place, and time. Cranial Nerves: No cranial nerve deficit. Deep Tendon Reflexes: Reflexes are normal and symmetric.    Psychiatric:         Behavior: Behavior normal.         Thought Content: Thought content normal.         Judgment: Judgment normal.       /70 (Site: Left Upper Arm, Position: Sitting, Cuff Size: Large Adult)   Resp 16   Wt 221 lb (100.2 kg)   LMP 04/19/2021 (Approximate)   Breastfeeding Yes   BMI 36.78 kg/m²     Assessment:       Diagnosis Orders   1. Women's annual routine gynecological examination  PAP Smear   2. Dysmenorrhea         Breast exam completed. Pelvic exam pap smear collected and sent. Cultures sent No    Plan:   Collect pap   BSE reviewed, Mammogram ordered: no  STD prevention reviewed  Gardisil counseling completed for all patients 10-35 yo  Cultures declined   Reviewed all hormonal contraceptives. She is on lamictal informed this is weak inducer ,but has the potential of antiepileptic drugs (AEDs) to cause contraceptive failure, contraceptive efficacy considering the AED prescribed. Discussed recommendation of LARC but would like to start bcp. Pt.counseled on  oral contraceptives. Start pills on the 1st day of flow, unless instructed to start on Sunday. Please take pills the same time every day. If you miss a pill or take it later, you may see breakthrough bleeding. This is not harmful, but can be annoying. Oral contraceptives prevent pregnancy, but do not protect against STD's,PLEASE USE CONDOMS. It is also recommended that you use condoms, while on antibiotics. DVT signs reviewed with patient. ( Chest Pain, SOB, Headaches with visual changes or numbness, calf tenderness). Diet & Exercise reviewed with pt. Preventive  Health through PCP   RV 3 months med check          Orders Placed This Encounter   Procedures    PAP Smear     Patient History:    Patient's last menstrual period was 04/19/2021 (approximate). OBGYN Status: Having periods  History reviewed. No pertinent surgical history.       Social History    Tobacco Use      Smoking status: Never Smoker      Smokeless tobacco: Current User        Types: Chew Standing Status:   Future     Standing Expiration Date:   5/3/2022     Order Specific Question:   Collection Type     Answer: Thin Prep     Order Specific Question:   Prior Abnormal Pap Test     Answer:   No     Order Specific Question:   Screening or Diagnostic     Answer:   Screening     Order Specific Question:   HPV Requested? Answer:   Yes -  If ASCUS Reflex HPV     Order Specific Question:   High Risk Patient     Answer:   N/A     No orders of the defined types were placed in this encounter. Patient given educational materials - seepatient instructions. Discussed use, benefit, and side effects of prescribed medications. All patient questions answered. Pt voiced understanding. Reviewed health maintenance. Instructed to continue current medications, diet and exercise. Patient agreedwith treatment plan. Follow up as directed.       Electronically signed by HUGO Lanier CNP on 5/3/2021at 2:15 PM

## 2021-05-05 LAB — CYTOLOGY REPORT: NORMAL

## 2021-05-07 ENCOUNTER — TELEPHONE (OUTPATIENT)
Dept: OBGYN CLINIC | Age: 26
End: 2021-05-07

## 2021-05-07 LAB
HPV SAMPLE: NORMAL
HPV, GENOTYPE 16: NOT DETECTED
HPV, GENOTYPE 18: NOT DETECTED
HPV, HIGH RISK OTHER: NOT DETECTED
HPV, INTERPRETATION: NORMAL
SPECIMEN DESCRIPTION: NORMAL

## 2021-05-07 RX ORDER — METRONIDAZOLE 500 MG/1
500 TABLET ORAL 2 TIMES DAILY
Qty: 14 TABLET | Refills: 0 | OUTPATIENT
Start: 2021-05-07 | End: 2021-05-14

## 2021-05-07 NOTE — TELEPHONE ENCOUNTER
patient called in requesting results from her pap smear. She is positive for bacterial vaginosis.  rx pending- phoned [facility] pharmacy

## 2021-05-08 ENCOUNTER — HOSPITAL ENCOUNTER (EMERGENCY)
Age: 26
Discharge: HOME | End: 2021-05-08
Attending: EMERGENCY MEDICINE
Payer: COMMERCIAL

## 2021-05-08 ENCOUNTER — APPOINTMENT (OUTPATIENT)
Dept: CT IMAGING | Age: 26
End: 2021-05-08
Payer: COMMERCIAL

## 2021-05-08 VITALS
WEIGHT: 220 LBS | TEMPERATURE: 99.5 F | OXYGEN SATURATION: 98 % | SYSTOLIC BLOOD PRESSURE: 114 MMHG | RESPIRATION RATE: 18 BRPM | HEART RATE: 123 BPM | BODY MASS INDEX: 40.48 KG/M2 | HEIGHT: 62 IN | DIASTOLIC BLOOD PRESSURE: 81 MMHG

## 2021-05-08 DIAGNOSIS — N12 PYELONEPHRITIS: ICD-10-CM

## 2021-05-08 DIAGNOSIS — N39.0 URINARY TRACT INFECTION WITHOUT HEMATURIA, SITE UNSPECIFIED: Primary | ICD-10-CM

## 2021-05-08 LAB
-: ABNORMAL
ABSOLUTE EOS #: 0.1 K/UL (ref 0–0.4)
ABSOLUTE IMMATURE GRANULOCYTE: ABNORMAL K/UL (ref 0–0.3)
ABSOLUTE LYMPH #: 3.5 K/UL (ref 1–4.8)
ABSOLUTE MONO #: 0.7 K/UL (ref 0.1–1.2)
ALBUMIN SERPL-MCNC: 4.4 G/DL (ref 3.5–5.2)
ALBUMIN/GLOBULIN RATIO: 1.5 (ref 1–2.5)
ALP BLD-CCNC: 87 U/L (ref 35–104)
ALT SERPL-CCNC: 42 U/L (ref 5–33)
AMORPHOUS: ABNORMAL
ANION GAP SERPL CALCULATED.3IONS-SCNC: 14 MMOL/L (ref 9–17)
AST SERPL-CCNC: 33 U/L
BACTERIA: ABNORMAL
BASOPHILS # BLD: 1 % (ref 0–2)
BASOPHILS ABSOLUTE: 0.1 K/UL (ref 0–0.2)
BILIRUB SERPL-MCNC: 0.33 MG/DL (ref 0.3–1.2)
BILIRUBIN URINE: NEGATIVE
BUN BLDV-MCNC: 5 MG/DL (ref 6–20)
BUN/CREAT BLD: ABNORMAL (ref 9–20)
CALCIUM SERPL-MCNC: 9.4 MG/DL (ref 8.6–10.4)
CASTS UA: ABNORMAL /LPF
CHLORIDE BLD-SCNC: 103 MMOL/L (ref 98–107)
CO2: 22 MMOL/L (ref 20–31)
COLOR: YELLOW
COMMENT UA: ABNORMAL
CREAT SERPL-MCNC: 0.61 MG/DL (ref 0.5–0.9)
CRYSTALS, UA: ABNORMAL /HPF
DIFFERENTIAL TYPE: ABNORMAL
EOSINOPHILS RELATIVE PERCENT: 1 % (ref 1–4)
EPITHELIAL CELLS UA: ABNORMAL /HPF (ref 0–5)
GFR AFRICAN AMERICAN: >60 ML/MIN
GFR NON-AFRICAN AMERICAN: >60 ML/MIN
GFR SERPL CREATININE-BSD FRML MDRD: ABNORMAL ML/MIN/{1.73_M2}
GFR SERPL CREATININE-BSD FRML MDRD: ABNORMAL ML/MIN/{1.73_M2}
GLUCOSE BLD-MCNC: 95 MG/DL (ref 70–99)
GLUCOSE URINE: NEGATIVE
HCG(URINE) PREGNANCY TEST: NEGATIVE
HCT VFR BLD CALC: 36.8 % (ref 36–46)
HEMOGLOBIN: 12.6 G/DL (ref 12–16)
IMMATURE GRANULOCYTES: ABNORMAL %
KETONES, URINE: NEGATIVE
LEUKOCYTE ESTERASE, URINE: ABNORMAL
LIPASE: 37 U/L (ref 13–60)
LYMPHOCYTES # BLD: 55 % (ref 24–44)
MAGNESIUM: 2.1 MG/DL (ref 1.6–2.6)
MCH RBC QN AUTO: 30.6 PG (ref 26–34)
MCHC RBC AUTO-ENTMCNC: 34.1 G/DL (ref 31–37)
MCV RBC AUTO: 89.6 FL (ref 80–100)
MONOCYTES # BLD: 10 % (ref 2–11)
MUCUS: ABNORMAL
NITRITE, URINE: NEGATIVE
NRBC AUTOMATED: ABNORMAL PER 100 WBC
OTHER OBSERVATIONS UA: ABNORMAL
PDW BLD-RTO: 13 % (ref 12.5–15.4)
PH UA: 6 (ref 5–8)
PLATELET # BLD: 306 K/UL (ref 140–450)
PLATELET ESTIMATE: ABNORMAL
PMV BLD AUTO: 7.2 FL (ref 6–12)
POTASSIUM SERPL-SCNC: 3.9 MMOL/L (ref 3.7–5.3)
PROTEIN UA: NEGATIVE
RBC # BLD: 4.11 M/UL (ref 4–5.2)
RBC # BLD: ABNORMAL 10*6/UL
RBC UA: ABNORMAL /HPF (ref 0–2)
RENAL EPITHELIAL, UA: ABNORMAL /HPF
SEG NEUTROPHILS: 33 % (ref 36–66)
SEGMENTED NEUTROPHILS ABSOLUTE COUNT: 2.1 K/UL (ref 1.8–7.7)
SODIUM BLD-SCNC: 139 MMOL/L (ref 135–144)
SPECIFIC GRAVITY UA: 1.01 (ref 1–1.03)
TOTAL PROTEIN: 7.4 G/DL (ref 6.4–8.3)
TRICHOMONAS: ABNORMAL
TURBIDITY: CLEAR
URINE HGB: ABNORMAL
UROBILINOGEN, URINE: NORMAL
WBC # BLD: 6.5 K/UL (ref 3.5–11)
WBC # BLD: ABNORMAL 10*3/UL
WBC UA: ABNORMAL /HPF (ref 0–5)
YEAST: ABNORMAL

## 2021-05-08 PROCEDURE — 36415 COLL VENOUS BLD VENIPUNCTURE: CPT

## 2021-05-08 PROCEDURE — 87186 SC STD MICRODIL/AGAR DIL: CPT

## 2021-05-08 PROCEDURE — 80053 COMPREHEN METABOLIC PANEL: CPT

## 2021-05-08 PROCEDURE — 99285 EMERGENCY DEPT VISIT HI MDM: CPT

## 2021-05-08 PROCEDURE — 74176 CT ABD & PELVIS W/O CONTRAST: CPT

## 2021-05-08 PROCEDURE — 85025 COMPLETE CBC W/AUTO DIFF WBC: CPT

## 2021-05-08 PROCEDURE — 81025 URINE PREGNANCY TEST: CPT

## 2021-05-08 PROCEDURE — 87077 CULTURE AEROBIC IDENTIFY: CPT

## 2021-05-08 PROCEDURE — 87086 URINE CULTURE/COLONY COUNT: CPT

## 2021-05-08 PROCEDURE — 96365 THER/PROPH/DIAG IV INF INIT: CPT

## 2021-05-08 PROCEDURE — 6360000002 HC RX W HCPCS: Performed by: EMERGENCY MEDICINE

## 2021-05-08 PROCEDURE — 83735 ASSAY OF MAGNESIUM: CPT

## 2021-05-08 PROCEDURE — 83690 ASSAY OF LIPASE: CPT

## 2021-05-08 PROCEDURE — 2580000003 HC RX 258: Performed by: EMERGENCY MEDICINE

## 2021-05-08 PROCEDURE — 96375 TX/PRO/DX INJ NEW DRUG ADDON: CPT

## 2021-05-08 PROCEDURE — 81001 URINALYSIS AUTO W/SCOPE: CPT

## 2021-05-08 RX ORDER — 0.9 % SODIUM CHLORIDE 0.9 %
1000 INTRAVENOUS SOLUTION INTRAVENOUS ONCE
Status: COMPLETED | OUTPATIENT
Start: 2021-05-08 | End: 2021-05-08

## 2021-05-08 RX ORDER — CEPHALEXIN 500 MG/1
500 CAPSULE ORAL 3 TIMES DAILY
Qty: 21 CAPSULE | Refills: 0 | Status: SHIPPED | OUTPATIENT
Start: 2021-05-08 | End: 2021-05-15

## 2021-05-08 RX ORDER — KETOROLAC TROMETHAMINE 30 MG/ML
30 INJECTION, SOLUTION INTRAMUSCULAR; INTRAVENOUS ONCE
Status: COMPLETED | OUTPATIENT
Start: 2021-05-08 | End: 2021-05-08

## 2021-05-08 RX ORDER — ONDANSETRON 2 MG/ML
4 INJECTION INTRAMUSCULAR; INTRAVENOUS ONCE
Status: COMPLETED | OUTPATIENT
Start: 2021-05-08 | End: 2021-05-08

## 2021-05-08 RX ADMIN — SODIUM CHLORIDE 1000 ML: 9 INJECTION, SOLUTION INTRAVENOUS at 22:00

## 2021-05-08 RX ADMIN — KETOROLAC TROMETHAMINE 30 MG: 30 INJECTION, SOLUTION INTRAMUSCULAR; INTRAVENOUS at 22:00

## 2021-05-08 RX ADMIN — CEFTRIAXONE SODIUM 1000 MG: 1 INJECTION, POWDER, FOR SOLUTION INTRAMUSCULAR; INTRAVENOUS at 22:46

## 2021-05-08 RX ADMIN — ONDANSETRON 4 MG: 2 INJECTION INTRAMUSCULAR; INTRAVENOUS at 22:00

## 2021-05-08 ASSESSMENT — ENCOUNTER SYMPTOMS
VOMITING: 0
NAUSEA: 0
BACK PAIN: 0
RHINORRHEA: 0
COUGH: 0
DIARRHEA: 0
SORE THROAT: 0
EYE PAIN: 0
SHORTNESS OF BREATH: 0
ABDOMINAL PAIN: 0

## 2021-05-08 ASSESSMENT — PAIN DESCRIPTION - LOCATION: LOCATION: BACK;ABDOMEN

## 2021-05-09 NOTE — ED PROVIDER NOTES
[id number] [facility] ED  [address] [facility] [address] [facility] [address]  Phone: [phone]  Fax: [phone] [facility]          Pt Name: Torrey Miramontes  MRN: [id number]  Nishigfcatarino 1995  Date of evaluation: 5/8/2021      CHIEF COMPLAINT       Chief Complaint   Patient presents with    Abdominal Pain     onset yesterday, patient sts she is currently on flagyl for BV.  Dysuria    Fever    Chills    Back Pain     lower       HISTORY OF PRESENT ILLNESS       Torrey  is a 32 y.o. female who presents with right flank pain. Began yesterday but worsened about 3 hours prior to arrival.  No hematuria. Does have some diarrhea. Does report some nausea without vomiting. She does report some dysuria and chills. Reports her temperature was just over 100 °F at home. Nothing otherwise makes it better or worse. No history of ureteral stones or pyelonephritis. No abdominal surgeries. States the pain radiates around into her right side of the abdomen. Denies other symptoms or concerns. REVIEW OF SYSTEMS       Review of Systems   Constitutional: Negative for chills, fatigue and fever. HENT: Negative for rhinorrhea and sore throat. Eyes: Negative for pain. Respiratory: Negative for cough and shortness of breath. Cardiovascular: Negative for chest pain. Gastrointestinal: Negative for abdominal pain, diarrhea, nausea and vomiting. Genitourinary: Positive for dysuria and flank pain. Negative for difficulty urinating, hematuria, vaginal bleeding and vaginal discharge. Musculoskeletal: Negative for back pain and neck pain. Skin: Negative for rash. Neurological: Negative for weakness and headaches. PAST MEDICAL HISTORY    has a past medical history of Acid reflux, Anemia, Bipolar 1 disorder ([address] Depression, Dysmenorrhea, Irritable bowel syndrome, and Kidney stone. SURGICAL HISTORY      has no past surgical history on file.     CURRENT MEDICATIONS Previous Medications    BD PEN NEEDLE MICRO U/F 32G X 6 MM MISC        LAMOTRIGINE (LAMICTAL) 25 MG TABLET        METFORMIN (GLUCOPHAGE-XR) 500 MG EXTENDED RELEASE TABLET        METRONIDAZOLE (FLAGYL) 500 MG TABLET    Take 1 tablet by mouth 2 times daily for 7 days    NORETHINDRONE-ETHINYL ESTRADIOL (LOESTRIN FE 1/20) 1-20 MG-MCG PER TABLET    Take 1 tablet by mouth daily    PROPRANOLOL (INDERAL) 10 MG TABLET        TRAZODONE (DESYREL) 100 MG TABLET        VICTOZA 18 MG/3ML SOPN SC INJECTION           ALLERGIES     is allergic to sulfamethoxazole-trimethoprim. FAMILY HISTORY     She indicated that her mother is alive. She indicated that her father is alive. She indicated that the status of her maternal grandmother is unknown. She indicated that the status of her paternal grandfather is unknown.     family history includes Arthritis in her mother; Cancer in her paternal grandfather; Diabetes in her father and maternal grandmother. SOCIAL HISTORY      reports that she has never smoked. Her smokeless tobacco use includes chew. She reports that she does not drink alcohol or use drugs. PHYSICAL EXAM     INITIAL VITALS:  height is 5' 2\" (1.575 m) and weight is 99.8 kg (220 lb). Her oral temperature is 99.5 °F (37.5 °C). Her blood pressure is 114/81 and her pulse is 123. Her respiration is 18 and oxygen saturation is 98%. Physical Exam   Constitutional: She appears well-developed and well-nourished. Non-toxic appearance. She does not appear ill. No distress. HENT:   Head: Normocephalic and atraumatic. Right Ear: External ear normal.   Left Ear: External ear normal.   Eyes: EOM and lids are normal.   Neck: No tracheal deviation present. Cardiovascular: Normal rate and regular rhythm. Pulmonary/Chest: Effort normal and breath sounds normal. No respiratory distress. Abdominal: Soft. There is no abdominal tenderness. There is no rigidity and no guarding. No abdominal tenderness.   Benign abdominal exam.   Musculoskeletal:      Comments: No flank or CVA tenderness. Neurological: She is alert. GCS eye subscore is 4. GCS verbal subscore is 5. GCS motor subscore is 6. Skin: Skin is warm and dry. Psychiatric: She has a normal mood and affect. Her speech is normal.   Vitals reviewed. DIFFERENTIAL DIAGNOSIS/ MDM:     Plan we initiate a further flank pain work-up. We will also treat her symptoms. DIAGNOSTIC RESULTS     EKG: All EKG's are interpreted by the Emergency Department Physician who either signs or Co-signs this chart in the absence of a cardiologist.        RADIOLOGY:   Interpretation per the Radiologist below, if available at the time of this note:  CT ABDOMEN PELVIS WO CONTRAST Additional Contrast? None   Final Result   Small volume of free low-attenuation fluid in the cul-de-sac, likely   physiologic. No other acute finding the abdomen or pelvis. Small bubble of air in the nearly empty urinary bladder may be secondary to   recent catheterization. Correlate clinically. No results found.     LABS:  Results for orders placed or performed during the hospital encounter of 05/08/21   Urinalysis Reflex to Culture    Specimen: Urine, clean catch   Result Value Ref Range    Color, UA YELLOW YELLOW    Turbidity UA CLEAR CLEAR    Glucose, Ur NEGATIVE NEGATIVE    Bilirubin Urine NEGATIVE NEGATIVE    Ketones, Urine NEGATIVE NEGATIVE    Specific Gravity, UA 1.010 1.005 - 1.030    Urine Hgb MODERATE (A) NEGATIVE    pH, UA 6.0 5.0 - 8.0    Protein, UA NEGATIVE NEGATIVE    Urobilinogen, Urine Normal Normal    Nitrite, Urine NEGATIVE NEGATIVE    Leukocyte Esterase, Urine SMALL (A) NEGATIVE    Urinalysis Comments NOT REPORTED    Pregnancy, Urine   Result Value Ref Range    HCG(Urine) Pregnancy Test NEGATIVE NEGATIVE   Comprehensive Metabolic Panel   Result Value Ref Range    Glucose 95 70 - 99 mg/dL    BUN 5 (L) 6 - 20 mg/dL    CREATININE 0.61 0.50 - 0.90 mg/dL    Bun/Cre Ratio NOT REPORTED 9 - 20    Calcium 9.4 8.6 - 10.4 mg/dL    Sodium 139 135 - 144 mmol/L    Potassium 3.9 3.7 - 5.3 mmol/L    Chloride 103 98 - 107 mmol/L    CO2 22 20 - 31 mmol/L    Anion Gap 14 9 - 17 mmol/L    Alkaline Phosphatase 87 35 - 104 U/L    ALT 42 (H) 5 - 33 U/L    AST 33 (H) <32 U/L    Total Bilirubin 0.33 0.3 - 1.2 mg/dL    Total Protein 7.4 6.4 - 8.3 g/dL    Albumin 4.4 3.5 - 5.2 g/dL    Albumin/Globulin Ratio 1.5 1.0 - 2.5    GFR Non-African American >60 >60 mL/min    GFR African American >60 >60 mL/min    GFR Comment          GFR Staging NOT REPORTED    CBC Auto Differential   Result Value Ref Range    WBC 6.5 3.5 - 11.0 k/uL    RBC 4.11 4.0 - 5.2 m/uL    Hemoglobin 12.6 12.0 - 16.0 g/dL    Hematocrit 36.8 36 - 46 %    MCV 89.6 80 - 100 fL    MCH 30.6 26 - 34 pg    MCHC 34.1 31 - 37 g/dL    RDW 13.0 12.5 - 15.4 %    Platelets 618 998 - 767 k/uL    MPV 7.2 6.0 - 12.0 fL    NRBC Automated NOT REPORTED per 100 WBC    Differential Type NOT REPORTED     Seg Neutrophils 33 (L) 36 - 66 %    Lymphocytes 55 (H) 24 - 44 %    Monocytes 10 2 - 11 %    Eosinophils % 1 1 - 4 %    Basophils 1 0 - 2 %    Immature Granulocytes NOT REPORTED 0 %    Segs Absolute 2.10 1.8 - 7.7 k/uL    Absolute Lymph # 3.50 1.0 - 4.8 k/uL    Absolute Mono # 0.70 0.1 - 1.2 k/uL    Absolute Eos # 0.10 0.0 - 0.4 k/uL    Basophils Absolute 0.10 0.0 - 0.2 k/uL    Absolute Immature Granulocyte NOT REPORTED 0.00 - 0.30 k/uL    WBC Morphology NOT REPORTED     RBC Morphology NOT REPORTED     Platelet Estimate NOT REPORTED    Lipase   Result Value Ref Range    Lipase 37 13 - 60 U/L   Magnesium   Result Value Ref Range    Magnesium 2.1 1.6 - 2.6 mg/dL   Microscopic Urinalysis   Result Value Ref Range    -          WBC, UA 10 TO 20 0 - 5 /HPF    RBC, UA 5 TO 10 0 - 2 /HPF    Casts UA NOT REPORTED /LPF    Crystals, UA NOT REPORTED None /HPF    Epithelial Cells UA 0 TO 2 0 - 5 /HPF    Renal Epithelial, UA NOT REPORTED 0 /HPF    Bacteria, UA MODERATE (A) None Mucus, UA NOT REPORTED None    Trichomonas, UA NOT REPORTED None    Amorphous, UA NOT REPORTED None    Other Observations UA Culture ordered based on defined criteria. (A) NOT REQ. Yeast, UA NOT REPORTED None       EMERGENCY DEPARTMENT COURSE:     The patient was given the following medications:  Orders Placed This Encounter   Medications    0.9 % sodium chloride bolus    ketorolac (TORADOL) injection 30 mg    ondansetron (ZOFRAN) injection 4 mg    cefTRIAXone (ROCEPHIN) 1000 mg IVPB in 50 mL D5W minibag     Order Specific Question:   Antimicrobial Indications     Answer:   Urinary Tract Infection    cephALEXin (KEFLEX) 500 MG capsule     Sig: Take 1 capsule by mouth 3 times daily for 7 days     Dispense:  21 capsule     Refill:  0        Vitals:    Vitals:    05/08/21 2134   BP: 114/81   Pulse: 123   Resp: 18   Temp: 99.5 °F (37.5 °C)   TempSrc: Oral   SpO2: 98%   Weight: 99.8 kg (220 lb)   Height: 5' 2\" (1.575 m)     -------------------------  BP: 114/81, Temp: 99.5 °F (37.5 °C), Pulse: 123, Resp: 18      Re-evaluation Notes    Patient doing well on reevaluation. No acute changes. Symptoms improved. Plan will be to discharge patient home. Does have a UTI and clinically most likely pyelonephritis. CT shows no acute significant findings. I do not feel the small bubble of air is significant in this case. Will give Rocephin IV and discharged home with cephalexin. She has a benign abdominal initial and reevaluation. I discussed all this with the patient and she is comfortable with the plan. I did discuss as well her elevated liver function studies. The patient understands that at this time there is no evidence for a more malignant underlying process, but the patient also understands that early in the process of an illness or injury, an emergency department workup can be falsely reassuring.   Routine discharge counseling was given, and the patient understands that worsening, changing or persistent symptoms should prompt an immediate call or follow up with their primary physician or return to the emergency department. The importance of appropriate follow up was also discussed. I have reviewed the disposition diagnosis with the patient and or their family/guardian. I have answered their questions and given discharge instructions. They voiced understanding of these instructions and did not have any further questions or complaints. CONSULTS:    None    CRITICAL CARE:     None    PROCEDURES:    None    FINAL IMPRESSION      1. Urinary tract infection without hematuria, site unspecified    2.  Pyelonephritis          DISPOSITION/PLAN   DISPOSITION Decision To Discharge 05/08/2021 11:10:06 PM      Condition on Disposition    Improved    PATIENT REFERRED TO:  John Kelly  [address]  [phone]    Schedule an appointment as soon as possible for a visit in 2 days        DISCHARGE MEDICATIONS:  New Prescriptions    CEPHALEXIN (KEFLEX) 500 MG CAPSULE    Take 1 capsule by mouth 3 times daily for 7 days       (Please note that portions of this note were completed with a voice recognition program.  Efforts were made to edit the dictations but occasionally words are mis-transcribed.)    Jane Blanco DO  Attending Emergency Physician       Jane Blanco DO  05/08/21 2701

## 2021-05-10 LAB
CULTURE: ABNORMAL
Lab: ABNORMAL
SPECIMEN DESCRIPTION: ABNORMAL

## 2022-01-25 ENCOUNTER — OFFICE VISIT (OUTPATIENT)
Dept: OBGYN CLINIC | Age: 27
End: 2022-01-25
Payer: COMMERCIAL

## 2022-01-25 ENCOUNTER — HOSPITAL ENCOUNTER (OUTPATIENT)
Age: 27
Setting detail: SPECIMEN
Discharge: HOME | End: 2022-01-25

## 2022-01-25 VITALS — DIASTOLIC BLOOD PRESSURE: 60 MMHG | SYSTOLIC BLOOD PRESSURE: 120 MMHG

## 2022-01-25 DIAGNOSIS — R10.2 PELVIC PAIN: ICD-10-CM

## 2022-01-25 DIAGNOSIS — N89.8 VAGINAL IRRITATION: ICD-10-CM

## 2022-01-25 DIAGNOSIS — N94.9 VAGINAL BURNING: ICD-10-CM

## 2022-01-25 DIAGNOSIS — N89.8 VAGINAL ODOR: ICD-10-CM

## 2022-01-25 DIAGNOSIS — N89.8 VAGINAL DISCHARGE: ICD-10-CM

## 2022-01-25 DIAGNOSIS — Z11.3 SCREEN FOR STD (SEXUALLY TRANSMITTED DISEASE): ICD-10-CM

## 2022-01-25 DIAGNOSIS — R30.0 DYSURIA: ICD-10-CM

## 2022-01-25 DIAGNOSIS — N89.8 VAGINAL DISCHARGE: Primary | ICD-10-CM

## 2022-01-25 LAB
-: ABNORMAL
AMORPHOUS: ABNORMAL
BACTERIA: ABNORMAL
BILIRUBIN URINE: NEGATIVE
CASTS UA: ABNORMAL /LPF (ref 0–2)
CASTS UA: ABNORMAL /LPF (ref 0–2)
COLOR: YELLOW
COMMENT UA: ABNORMAL
CRYSTALS, UA: ABNORMAL /HPF
EPITHELIAL CELLS UA: ABNORMAL /HPF (ref 0–5)
GLUCOSE URINE: NEGATIVE
KETONES, URINE: NEGATIVE
LEUKOCYTE ESTERASE, URINE: ABNORMAL
MUCUS: ABNORMAL
NITRITE, URINE: NEGATIVE
OTHER OBSERVATIONS UA: ABNORMAL
PH UA: 5.5 (ref 5–8)
PROTEIN UA: NEGATIVE
RBC UA: ABNORMAL /HPF (ref 0–2)
RENAL EPITHELIAL, UA: ABNORMAL /HPF
SPECIFIC GRAVITY UA: 1.01 (ref 1–1.03)
TRICHOMONAS: ABNORMAL
TURBIDITY: CLEAR
URINE HGB: ABNORMAL
UROBILINOGEN, URINE: NORMAL
WBC UA: ABNORMAL /HPF (ref 0–5)
YEAST: ABNORMAL

## 2022-01-25 PROCEDURE — G8484 FLU IMMUNIZE NO ADMIN: HCPCS | Performed by: NURSE PRACTITIONER

## 2022-01-25 PROCEDURE — G8427 DOCREV CUR MEDS BY ELIG CLIN: HCPCS | Performed by: NURSE PRACTITIONER

## 2022-01-25 PROCEDURE — G8417 CALC BMI ABV UP PARAM F/U: HCPCS | Performed by: NURSE PRACTITIONER

## 2022-01-25 PROCEDURE — 99214 OFFICE O/P EST MOD 30 MIN: CPT | Performed by: NURSE PRACTITIONER

## 2022-01-25 PROCEDURE — 4004F PT TOBACCO SCREEN RCVD TLK: CPT | Performed by: NURSE PRACTITIONER

## 2022-01-25 RX ORDER — GREEN TEA/HOODIA GORDONII 315-12.5MG
1 CAPSULE ORAL DAILY
Qty: 30 TABLET | Refills: 11 | Status: SHIPPED | OUTPATIENT
Start: 2022-01-25 | End: 2022-02-24

## 2022-01-25 NOTE — PROGRESS NOTES
[address] [place] [address],[address] [facility] [address]  [address]  [address] [address]  Dept: [phone]  Dept Fax: [phone]    Yesi Tejada is a 32 y.o. female who presents today for her medical conditions/complaintsas noted below. Yesi Tejada is c/o of Vaginal Discharge (cultures)        HPI:     HPI  Pt is here with complaints of a vaginal discharge with positive Odor, positive  Itching, for months intermittently states will wax ansd wane. She bought OTC boric acid suppositories and feels gets hannah rthen comes back.    + burning with urination no other UTI symptoms. no pelvic pain, fevers, chills, nausea/vomiting. No recent antibiotics, changes in soaps. 3 new partners did not use condoms with all of them    Also c/o left sided pelvic pain, states will note around what she believes is ovulation time for her and always on left side then 2 weeks later around period will have that same left sided pain. She denies dysapreunia, she state sthis has been for years off and on. She has neve rhad pelvic US or denies known hx of ovarian cysts. She does have hx IBS mixed constipation diarrhea also but she isnt sure corelated to this. She does not believe when she was on OCP years ago that she had that pain. She never started OCP that wa spresribed last year. LMP- 1/17/22      Past Medical History:   Diagnosis Date    Acid reflux     Anemia     Bipolar 1 disorder (HCC)     Depression     Dysmenorrhea     Irritable bowel syndrome     Kidney stone       History reviewed. No pertinent surgical history.     Family History   Problem Relation Age of Onset    Arthritis Mother     Diabetes Father     Diabetes Maternal Grandmother     Cancer Paternal Grandfather        Social History     Tobacco Use    Smoking status: Never Smoker    Smokeless tobacco: Current User     Types: Chew   Substance Use Topics    Alcohol use: No     Alcohol/week: 0.0 standard drinks      Current Outpatient Medications   Medication Sig Dispense Refill    Probiotic Acidophilus (FLORANEX) TABS Take 1 tablet by mouth daily 30 tablet 11    nitrofurantoin, macrocrystal-monohydrate, (MACROBID) 100 MG capsule Take 1 capsule by mouth 2 times daily for 7 days 14 capsule 0    BD PEN NEEDLE MICRO U/F 32G X 6 MM MISC       lamoTRIgine (LAMICTAL) 25 MG tablet       VICTOZA 18 MG/3ML SOPN SC injection       metFORMIN (GLUCOPHAGE-XR) 500 MG extended release tablet       propranolol (INDERAL) 10 MG tablet       traZODone (DESYREL) 100 MG tablet        No current facility-administered medications for this visit. Allergies   Allergen Reactions    Sulfamethoxazole-Trimethoprim Hives     Other reaction(s): Unknown       Health Maintenance   Topic Date Due    Hepatitis C screen  Never done    Depression Monitoring  Never done    HPV vaccine (3 - 2-dose series) 01/26/2008    Varicella vaccine (2 of 2 - 13+ 2-dose series) 08/28/2008    HIV screen  Never done    Flu vaccine (1) 09/01/2021    Pap smear  05/03/2024    DTaP/Tdap/Td vaccine (3 - Td or Tdap) 10/24/2025    Meningococcal (ACWY) vaccine  Completed    COVID-19 Vaccine  Completed    Hepatitis A vaccine  Aged Out    Hepatitis B vaccine  Aged Out    Hib vaccine  Aged Out    Pneumococcal 0-64 years Vaccine  Aged Out       Subjective:     Review of Systems   Constitutional: Negative for chills and fever. Respiratory: Negative for shortness of breath and wheezing. Cardiovascular: Negative for chest pain and leg swelling. Gastrointestinal: Positive for constipation and diarrhea. Negative for nausea and vomiting. Genitourinary: Positive for dysuria, pelvic pain, vaginal discharge and vaginal pain. Negative for dyspareunia, flank pain, frequency, menstrual problem, urgency and vaginal bleeding. Skin: Negative for color change and pallor. Neurological: Negative for dizziness, light-headedness and headaches.    Hematological: Negative for adenopathy. Does not bruise/bleed easily. Psychiatric/Behavioral: Negative for self-injury and suicidal ideas. Objective:     Physical Exam  Vitals and nursing note reviewed. Constitutional:       General: She is not in acute distress. Appearance: She is well-developed. She is not diaphoretic. HENT:      Head: Normocephalic and atraumatic. Right Ear: External ear normal.      Left Ear: External ear normal.      Nose: Nose normal.   Eyes:      Pupils: Pupils are equal, round, and reactive to light. Cardiovascular:      Rate and Rhythm: Normal rate and regular rhythm. Heart sounds: Normal heart sounds. No murmur heard. No friction rub. No gallop. Pulmonary:      Effort: Pulmonary effort is normal.      Breath sounds: Normal breath sounds. No wheezing. Abdominal:      General: Bowel sounds are normal.      Palpations: Abdomen is soft. Abdomen is not rigid. Tenderness: There is no abdominal tenderness. There is no guarding or rebound. Genitourinary:     Labia:         Right: No rash, tenderness, lesion or injury. Left: No rash, tenderness, lesion or injury. Vagina: No signs of injury and foreign body. Vaginal discharge present. No erythema, tenderness or bleeding. Cervix: No cervical motion tenderness, discharge or friability. Uterus: Not enlarged and not tender. Adnexa:         Right: No mass, tenderness or fullness. Left: No mass, tenderness or fullness. Musculoskeletal:         General: Normal range of motion. Cervical back: Normal range of motion and neck supple. Skin:     General: Skin is warm and dry. Findings: No rash. Neurological:      Mental Status: She is alert and oriented to person, place, and time. Cranial Nerves: No cranial nerve deficit. Psychiatric:         Behavior: Behavior normal.         Thought Content:  Thought content normal.         Judgment: Judgment normal.       /60 (Site: Right Upper Arm, Position: Sitting, Cuff Size: Large Adult)   LMP 01/17/2022     Assessment:       Diagnosis Orders   1. Vaginal discharge  VAGINITIS DNA PROBE    Chlamydia Trachomatis & Neisseria gonorrhoeae (GC) by amplified detection   2. Vaginal odor  VAGINITIS DNA PROBE    Chlamydia Trachomatis & Neisseria gonorrhoeae (GC) by amplified detection   3. Vaginal irritation  VAGINITIS DNA PROBE    Chlamydia Trachomatis & Neisseria gonorrhoeae (GC) by amplified detection   4. Vaginal burning  VAGINITIS DNA PROBE    Chlamydia Trachomatis & Neisseria gonorrhoeae (GC) by amplified detection    Culture, Urine    Urinalysis   5. Screen for STD (sexually transmitted disease)  VAGINITIS DNA PROBE    Chlamydia Trachomatis & Neisseria gonorrhoeae (GC) by amplified detection   6. Pelvic pain     7. Dysuria  Culture, Urine    Urinalysis       Pelvic exam completed cultures obtained and sent    Plan:      Vaginal discharge- check cultures tx if positive. No CMT tenderness or pelvic tenderness with exam, denies consitutional symptoms. Monitor for fevers, chills, n/v, abdominal/pelvic let us know if occurs. Encouraged  safe sex practices. Dysuria- check urine tx if +    Pelvic pain - check pelvic US. Monitor severe pain especially ones sided, heavy bleeding >1 tampon/pad hr, fevers/chills notify us, go to ER. Return in about 4 months (around 5/25/2022), or if symptoms worsen or fail to improve, for annual exam.    Orders Placed This Encounter   Procedures    VAGINITIS DNA PROBE    Chlamydia Trachomatis & Neisseria gonorrhoeae ([address]) by amplified detection     Standing Status:   Future     Number of Occurrences:   1     Standing Expiration Date:   1/25/2023    Culture, Urine     Standing Status:   Future     Number of Occurrences:   1     Standing Expiration Date:   1/25/2023     Order Specific Question:   Specify (ex-cath, midstream, cysto, etc)?      Answer:   clean catch    Urinalysis     Standing

## 2022-01-26 LAB
C TRACH DNA GENITAL QL NAA+PROBE: NEGATIVE
CANDIDA SPECIES, DNA PROBE: NEGATIVE
GARDNERELLA VAGINALIS, DNA PROBE: NEGATIVE
N. GONORRHOEAE DNA: NEGATIVE
SOURCE: NORMAL
SPECIMEN DESCRIPTION: NORMAL
TRICHOMONAS VAGINALIS DNA: NEGATIVE

## 2022-01-27 ENCOUNTER — TELEPHONE (OUTPATIENT)
Dept: OBGYN CLINIC | Age: 27
End: 2022-01-27

## 2022-01-27 LAB
CULTURE: ABNORMAL
Lab: ABNORMAL
SPECIMEN DESCRIPTION: ABNORMAL

## 2022-01-27 RX ORDER — NITROFURANTOIN 25; 75 MG/1; MG/1
100 CAPSULE ORAL 2 TIMES DAILY
Qty: 14 CAPSULE | Refills: 0 | Status: SHIPPED | OUTPATIENT
Start: 2022-01-27 | End: 2022-02-03

## 2022-01-27 ASSESSMENT — ENCOUNTER SYMPTOMS
VOMITING: 0
CONSTIPATION: 1
NAUSEA: 0
COLOR CHANGE: 0
SHORTNESS OF BREATH: 0
DIARRHEA: 1
WHEEZING: 0

## 2022-02-02 ENCOUNTER — TELEPHONE (OUTPATIENT)
Dept: OBGYN CLINIC | Age: 27
End: 2022-02-02

## 2022-02-02 NOTE — TELEPHONE ENCOUNTER
----- Message from HUGO Batres CNP sent at 2/1/2022  5:08 PM EST -----  There is possible bowel to anterior uterus could be causing some of her pain we can refer her to GI but if still having pain should schedule follow up and we can discuss ordering further imaging vs GI consult or both

## 2022-07-20 ENCOUNTER — OFFICE VISIT (OUTPATIENT)
Dept: OBGYN CLINIC | Age: 27
End: 2022-07-20
Payer: COMMERCIAL

## 2022-07-20 VITALS
WEIGHT: 222.7 LBS | DIASTOLIC BLOOD PRESSURE: 84 MMHG | SYSTOLIC BLOOD PRESSURE: 122 MMHG | BODY MASS INDEX: 40.98 KG/M2 | HEIGHT: 62 IN

## 2022-07-20 DIAGNOSIS — R10.2 PELVIC PAIN: ICD-10-CM

## 2022-07-20 DIAGNOSIS — R19.7 DIARRHEA, UNSPECIFIED TYPE: ICD-10-CM

## 2022-07-20 DIAGNOSIS — N91.2 AMENORRHEA: Primary | ICD-10-CM

## 2022-07-20 PROCEDURE — 4004F PT TOBACCO SCREEN RCVD TLK: CPT | Performed by: NURSE PRACTITIONER

## 2022-07-20 PROCEDURE — G8417 CALC BMI ABV UP PARAM F/U: HCPCS | Performed by: NURSE PRACTITIONER

## 2022-07-20 PROCEDURE — G8427 DOCREV CUR MEDS BY ELIG CLIN: HCPCS | Performed by: NURSE PRACTITIONER

## 2022-07-20 PROCEDURE — 99214 OFFICE O/P EST MOD 30 MIN: CPT | Performed by: NURSE PRACTITIONER

## 2022-07-20 RX ORDER — SERTRALINE HYDROCHLORIDE 100 MG/1
100 TABLET, FILM COATED ORAL DAILY
COMMUNITY

## 2022-07-20 NOTE — PROGRESS NOTES
[address] [place] [address],[address] [facility] [address]  [address] [place]  [address] [address]  Dept: [phone]  Dept Fax: [phone]     Christoph Pretty is a 32 y.o. female who presents today for her medical conditions/complaintsas noted below. Christoph Pretty is c/o of Follow-up (Colonoscopy wnl) and Amenorrhea (Lmp 6/18/22)        HPI:     HPI    Heather Farmer is here for follow up in regards to left lower abdominal/pelvic pain. She had been seen in January 2022 and been noting left sided pelvic pain during what she believes is her ovulation time mid cycle. She states will have cramping pain with menses but worse around mid cycle. Mid cycle pain is severe radiates to back and down left leg even at times. SHe has hx of IBS and has been having significant diarrhea with this and at times constipation. She does admit that her diarrhea symptoms seem to worsen during mid cycle and during menses. Denies hematochezia. Denies UTI symptoms    She had pelvic US in our office on 1/25/22  Dx: Pelvic pain       Uterus: 7.3x4.1x2.9cm, anteverted   Endometrial stripe: 9.0mm   Hyperechoic area anterior to uterus, likely bowel   Rt ovary visualized with normal appearing dominant follicle   Lt ovary not visualized   No free fluid in pelvis   Was informed there was possible bowel to anterior uterus could be causing some of her pain we can refer her to GI but if still having pain should schedule follow up and we can discuss ordering further imaging vs GI consult . She ended up going to GI and had colonoscopy in May 2022 that was normal.     She states is till concerned about pain and possibility endometriosis/adhesions. She denies dyspareunia or PCB. She states she is also late for her menstrual cycle. She states cycles have been regular every 26-28 days and her LMP was 6/18/22. She states took home pregnancy test and was negative.  She is sexually active and not using hormonal Pneumococcal 0-64 years Vaccine  Aged Out       Subjective:     Review of Systems   Constitutional:  Negative for chills and fever. Respiratory:  Negative for shortness of breath and wheezing. Cardiovascular:  Negative for chest pain, palpitations and leg swelling. Gastrointestinal:  Positive for abdominal pain, constipation and diarrhea. Negative for blood in stool, nausea and vomiting. Endocrine:        Denies galactorrhea   Genitourinary:  Positive for menstrual problem and pelvic pain. Negative for dyspareunia, dysuria, frequency, vaginal bleeding, vaginal discharge and vaginal pain. Skin:  Negative for color change and pallor. Neurological:  Negative for dizziness and headaches. Hematological:  Negative for adenopathy. Does not bruise/bleed easily. Psychiatric/Behavioral:  Negative for self-injury and suicidal ideas. Objective:     Physical Exam  Vitals and nursing note reviewed. Constitutional:       General: She is not in acute distress. Appearance: She is well-developed. She is not diaphoretic. HENT:      Head: Normocephalic and atraumatic. Right Ear: External ear normal.      Left Ear: External ear normal.      Nose: Nose normal.   Eyes:      Pupils: Pupils are equal, round, and reactive to light. Neck:      Thyroid: No thyromegaly. Cardiovascular:      Rate and Rhythm: Normal rate and regular rhythm. Heart sounds: Normal heart sounds. No murmur heard. No friction rub. No gallop. Pulmonary:      Effort: Pulmonary effort is normal.      Breath sounds: Normal breath sounds. No wheezing. Abdominal:      General: Bowel sounds are normal.      Palpations: Abdomen is soft. Tenderness: There is no abdominal tenderness. Musculoskeletal:         General: Normal range of motion. Cervical back: Normal range of motion and neck supple. Lymphadenopathy:      Cervical: No cervical adenopathy. Skin:     General: Skin is warm and dry. Findings: No rash. Neurological:      Mental Status: She is alert and oriented to person, place, and time. Cranial Nerves: No cranial nerve deficit. Psychiatric:         Behavior: Behavior normal.         Thought Content: Thought content normal.         Judgment: Judgment normal.     /84 (Site: Left Upper Arm, Position: Sitting, Cuff Size: Large Adult)   Ht 5' 2\" (1.575 m)   Wt 222 lb 11.2 oz (101 kg)   LMP 06/18/2022   BMI 40.73 kg/m²     Assessment:          Diagnosis Orders   1. Amenorrhea  Follicle Stimulating Hormone    Prolactin    CBC with Auto Differential    TSH with Reflex    HCG, Quantitative, Pregnancy      2. Pelvic pain        3. Diarrhea, unspecified type            Plan:      Pelvic pain  Discussed could order ct scan/MRI  Discussed possible management of pelvic pain discussed with patient. Pros and cons of medical management with OCPs- switching to another form of birth control that decreases inflammation or decreasing the number of cycles, etc, Depo-Provera injections or cyclic progestogens, IUD benefits discussed, discussed orillissa also. Discussed surgical management with laparoscopy, endometrial ablation, hysteroscopy. She does not want hormonal contraception or orilissa at this point. If her serum hcg is negative today she wants consult with Dr. Abby Gomez to discuss laparoscopy. Amenorrhea- check labs as noted    Monitor severe pain especially ones sided, heavy bleeding >1 tampon/pad hr, fevers/chills notify us, go to ER. Return for consult [facility] for laparoscopy .        Orders Placed This Encounter   Procedures    Follicle Stimulating Hormone     Standing Status:   Future     Standing Expiration Date:   7/20/2023    Prolactin     Standing Status:   Future     Standing Expiration Date:   7/20/2023    CBC with Auto Differential     Standing Status:   Future     Standing Expiration Date:   7/20/2023    TSH with Reflex     Standing Status:   Future     Standing Expiration Date: 7/20/2023    HCG, Quantitative, Pregnancy     Standing Status:   Future     Standing Expiration Date:   7/20/2023     No orders of the defined types were placed in this encounter. Patient given educational materials - seepatient instructions. Discussed use, benefit, and side effects of prescribed medications. All patient questions answered. Pt voiced understanding. Reviewed health maintenance. Instructed to continue current medications, diet and exercise. Patient agreedwith treatment plan. Follow up as directed. Electronically signed by HUGO Dan CNP on 7/21/2022at 10:21 AM      Of the 30 minute duration appointment visit, Sebastián Dumont CNP spent at least 50% of the face-to-face time in counseling, explanation of diagnosis, planning of further management, and answering all questions.

## 2022-07-21 ENCOUNTER — HOSPITAL ENCOUNTER (OUTPATIENT)
Age: 27
Setting detail: SPECIMEN
Discharge: HOME | End: 2022-07-21

## 2022-07-21 DIAGNOSIS — N91.2 AMENORRHEA: ICD-10-CM

## 2022-07-21 LAB
ABSOLUTE EOS #: 0.05 K/UL (ref 0–0.44)
ABSOLUTE IMMATURE GRANULOCYTE: 0.03 K/UL (ref 0–0.3)
ABSOLUTE LYMPH #: 3.43 K/UL (ref 1.1–3.7)
ABSOLUTE MONO #: 0.51 K/UL (ref 0.1–1.2)
BASOPHILS # BLD: 1 % (ref 0–2)
BASOPHILS ABSOLUTE: 0.03 K/UL (ref 0–0.2)
EOSINOPHILS RELATIVE PERCENT: 1 % (ref 1–4)
FOLLICLE STIMULATING HORMONE: 0.5 MIU/ML (ref 1.7–21.5)
HCG QUANTITATIVE: 1514 MIU/ML
HCT VFR BLD CALC: 42.2 % (ref 36.3–47.1)
HEMOGLOBIN: 13.4 G/DL (ref 11.9–15.1)
IMMATURE GRANULOCYTES: 1 %
LYMPHOCYTES # BLD: 55 % (ref 24–43)
MCH RBC QN AUTO: 31.1 PG (ref 25.2–33.5)
MCHC RBC AUTO-ENTMCNC: 31.8 G/DL (ref 28.4–34.8)
MCV RBC AUTO: 97.9 FL (ref 82.6–102.9)
MONOCYTES # BLD: 9 % (ref 3–12)
NRBC AUTOMATED: 0 PER 100 WBC
PDW BLD-RTO: 12.8 % (ref 11.8–14.4)
PLATELET # BLD: 426 K/UL (ref 138–453)
PMV BLD AUTO: 10.1 FL (ref 8.1–13.5)
PROLACTIN: 35.32 NG/ML (ref 4.79–23.3)
RBC # BLD: 4.31 M/UL (ref 3.95–5.11)
SEG NEUTROPHILS: 33 % (ref 36–65)
SEGMENTED NEUTROPHILS ABSOLUTE COUNT: 1.97 K/UL (ref 1.5–8.1)
TSH SERPL DL<=0.05 MIU/L-ACNC: 1.27 UIU/ML (ref 0.3–5)
WBC # BLD: 6 K/UL (ref 3.5–11.3)

## 2022-07-21 ASSESSMENT — ENCOUNTER SYMPTOMS
DIARRHEA: 1
VOMITING: 0
NAUSEA: 0
WHEEZING: 0
ABDOMINAL PAIN: 1
CONSTIPATION: 1
SHORTNESS OF BREATH: 0
BLOOD IN STOOL: 0
COLOR CHANGE: 0

## 2022-07-27 ENCOUNTER — TELEPHONE (OUTPATIENT)
Dept: OBGYN CLINIC | Age: 27
End: 2022-07-27

## 2022-07-27 NOTE — TELEPHONE ENCOUNTER
Ob education/Intake (telephone Visit): IPV scheduled on     Planned/Unplanned Pregnancy-unplanned but both patient and fob are very happy. Father of Nu De Anda-    Father of baby Ethnicity: White    Patient Ethnicity: White    Pt occupation:Veterinary tech    GsPs:     Gr 1  Para 0     Section History/Vaginal Delivery History-  N/A    LMP-      Patient has hx of regular cycles    BCP at Conception:  No    Blood type:  ? Any medications/drugs/alcohol at conception/currently:     Rexulti     Zoloft     Buspirone     Trazodone    Tobacco abuse:  Patient chews tobacco but stopped when she found out she was preg. Having a very difficult time with this    Substance Abuse History-  Denies    Depression/Anxiety History- Yes- both- patient has been on medication for about 2 yrs. Domestic Abuse History- No    Pets in home: Yes 2 cats and is a , but did know re cats and not changing cat litter home or work/ re: toxoplasmosis    Last Pap:  5-3-21  normal Pap    Hx abnormal PAP: No- Denies any surg to cervix. Pt BMI:  40.6       Hgt 5'2\"  Wgt 222. Patient PMH . History of:    Any history of genetic or chromosomal aberrations, spina bifida or abdominal wall defects; omphalocele's or gastroschisis in herself the father to be or their respective families:     Denies. Hx Hypothyroidism:  Denies. Hx preeclampsia or HTN: No    Hx PPD: n/a    Hx Diabetes: Denies    Hx GDM:   N/A    Hx STI: ( hx HSV 1 or 2)  Denies. COVID Vaccine: Yes    Jesus and Jesus w/Pfizer booster    Flu Vaccine: No    High Risk Factor Screening for this Pregnancy:    · Age greater than 35 at delivery: No- Patient is 32    · History of  delivery: N/A    · History of diabetes (gestational or outside of pregnancy): N/A    History of cHTN  No    Screening for pregestational diabetes:    ? BMI greater than 30: Yes  BMI is 40.6   If Yes, need early glucola    ?  BMI greater than 25

## 2022-07-28 ENCOUNTER — APPOINTMENT (OUTPATIENT)
Dept: ULTRASOUND IMAGING | Age: 27
End: 2022-07-28
Payer: COMMERCIAL

## 2022-07-28 ENCOUNTER — HOSPITAL ENCOUNTER (EMERGENCY)
Age: 27
Discharge: HOME | End: 2022-07-28
Attending: EMERGENCY MEDICINE
Payer: COMMERCIAL

## 2022-07-28 VITALS
HEIGHT: 62 IN | BODY MASS INDEX: 40.48 KG/M2 | WEIGHT: 220 LBS | SYSTOLIC BLOOD PRESSURE: 108 MMHG | HEART RATE: 76 BPM | TEMPERATURE: 98.4 F | OXYGEN SATURATION: 97 % | RESPIRATION RATE: 16 BRPM | DIASTOLIC BLOOD PRESSURE: 71 MMHG

## 2022-07-28 DIAGNOSIS — Z3A.01 LESS THAN 8 WEEKS GESTATION OF PREGNANCY: Primary | ICD-10-CM

## 2022-07-28 DIAGNOSIS — R10.32 ABDOMINAL PAIN, LEFT LOWER QUADRANT: ICD-10-CM

## 2022-07-28 LAB
ABO/RH: NORMAL
ABSOLUTE EOS #: 0 K/UL (ref 0–0.4)
ABSOLUTE LYMPH #: 3 K/UL (ref 1–4.8)
ABSOLUTE MONO #: 0.9 K/UL (ref 0.1–1.2)
ALBUMIN SERPL-MCNC: 4.9 G/DL (ref 3.5–5.2)
ALBUMIN/GLOBULIN RATIO: 2.2 (ref 1–2.5)
ALP BLD-CCNC: 84 U/L (ref 35–104)
ALT SERPL-CCNC: 28 U/L (ref 5–33)
ANION GAP SERPL CALCULATED.3IONS-SCNC: 11 MMOL/L (ref 9–17)
AST SERPL-CCNC: 15 U/L
BASOPHILS # BLD: 0 % (ref 0–2)
BASOPHILS ABSOLUTE: 0 K/UL (ref 0–0.2)
BILIRUB SERPL-MCNC: 0.55 MG/DL (ref 0.3–1.2)
BILIRUBIN URINE: NEGATIVE
BUN BLDV-MCNC: 7 MG/DL (ref 6–20)
CALCIUM SERPL-MCNC: 9.5 MG/DL (ref 8.6–10.4)
CHLORIDE BLD-SCNC: 99 MMOL/L (ref 98–107)
CO2: 24 MMOL/L (ref 20–31)
COLOR: YELLOW
COMMENT UA: NORMAL
CREAT SERPL-MCNC: 0.53 MG/DL (ref 0.5–0.9)
EOSINOPHILS RELATIVE PERCENT: 1 % (ref 1–4)
GFR AFRICAN AMERICAN: >60 ML/MIN
GFR NON-AFRICAN AMERICAN: >60 ML/MIN
GFR SERPL CREATININE-BSD FRML MDRD: ABNORMAL ML/MIN/{1.73_M2}
GLUCOSE BLD-MCNC: 95 MG/DL (ref 70–99)
GLUCOSE URINE: NEGATIVE
HCG QUANTITATIVE: ABNORMAL MIU/ML
HCT VFR BLD CALC: 38 % (ref 36–46)
HEMOGLOBIN: 13.2 G/DL (ref 12–16)
KETONES, URINE: NEGATIVE
LEUKOCYTE ESTERASE, URINE: NEGATIVE
LIPASE: 31 U/L (ref 13–60)
LYMPHOCYTES # BLD: 31 % (ref 24–44)
MAGNESIUM: 1.9 MG/DL (ref 1.6–2.6)
MCH RBC QN AUTO: 32.2 PG (ref 26–34)
MCHC RBC AUTO-ENTMCNC: 34.6 G/DL (ref 31–37)
MCV RBC AUTO: 93.1 FL (ref 80–100)
MONOCYTES # BLD: 9 % (ref 2–11)
NITRITE, URINE: NEGATIVE
PDW BLD-RTO: 13.4 % (ref 12.5–15.4)
PH UA: 6 (ref 5–8)
PLATELET # BLD: 410 K/UL (ref 140–450)
PMV BLD AUTO: 7.7 FL (ref 6–12)
POTASSIUM SERPL-SCNC: 4 MMOL/L (ref 3.7–5.3)
PROTEIN UA: NEGATIVE
RBC # BLD: 4.08 M/UL (ref 4–5.2)
SEG NEUTROPHILS: 59 % (ref 36–66)
SEGMENTED NEUTROPHILS ABSOLUTE COUNT: 6 K/UL (ref 1.8–7.7)
SODIUM BLD-SCNC: 134 MMOL/L (ref 135–144)
SPECIFIC GRAVITY UA: 1.01 (ref 1–1.03)
TOTAL PROTEIN: 7.1 G/DL (ref 6.4–8.3)
TURBIDITY: CLEAR
URINE HGB: NEGATIVE
UROBILINOGEN, URINE: NORMAL
WBC # BLD: 10 K/UL (ref 3.5–11)

## 2022-07-28 PROCEDURE — 76817 TRANSVAGINAL US OBSTETRIC: CPT

## 2022-07-28 PROCEDURE — 83690 ASSAY OF LIPASE: CPT

## 2022-07-28 PROCEDURE — 36415 COLL VENOUS BLD VENIPUNCTURE: CPT

## 2022-07-28 PROCEDURE — 2580000003 HC RX 258: Performed by: EMERGENCY MEDICINE

## 2022-07-28 PROCEDURE — 85025 COMPLETE CBC W/AUTO DIFF WBC: CPT

## 2022-07-28 PROCEDURE — 99284 EMERGENCY DEPT VISIT MOD MDM: CPT

## 2022-07-28 PROCEDURE — 83735 ASSAY OF MAGNESIUM: CPT

## 2022-07-28 PROCEDURE — 86900 BLOOD TYPING SEROLOGIC ABO: CPT

## 2022-07-28 PROCEDURE — 76856 US EXAM PELVIC COMPLETE: CPT

## 2022-07-28 PROCEDURE — 81003 URINALYSIS AUTO W/O SCOPE: CPT

## 2022-07-28 PROCEDURE — 86901 BLOOD TYPING SEROLOGIC RH(D): CPT

## 2022-07-28 PROCEDURE — 84702 CHORIONIC GONADOTROPIN TEST: CPT

## 2022-07-28 PROCEDURE — 80053 COMPREHEN METABOLIC PANEL: CPT

## 2022-07-28 RX ORDER — BUSPIRONE HYDROCHLORIDE 10 MG/1
10 TABLET ORAL DAILY
COMMUNITY

## 2022-07-28 RX ORDER — 0.9 % SODIUM CHLORIDE 0.9 %
1000 INTRAVENOUS SOLUTION INTRAVENOUS ONCE
Status: COMPLETED | OUTPATIENT
Start: 2022-07-28 | End: 2022-07-28

## 2022-07-28 RX ADMIN — SODIUM CHLORIDE 1000 ML: 9 INJECTION, SOLUTION INTRAVENOUS at 20:19

## 2022-07-28 ASSESSMENT — ENCOUNTER SYMPTOMS
ABDOMINAL PAIN: 1
BACK PAIN: 0
NAUSEA: 1
DIARRHEA: 1
SORE THROAT: 0
CONSTIPATION: 0
EYE PAIN: 0
VOMITING: 0
RHINORRHEA: 0
COUGH: 0
SHORTNESS OF BREATH: 0

## 2022-07-28 ASSESSMENT — PAIN DESCRIPTION - PAIN TYPE: TYPE: ACUTE PAIN

## 2022-07-28 ASSESSMENT — PAIN DESCRIPTION - ORIENTATION: ORIENTATION: LEFT

## 2022-07-28 ASSESSMENT — PAIN DESCRIPTION - LOCATION: LOCATION: ABDOMEN

## 2022-07-28 ASSESSMENT — PAIN SCALES - GENERAL: PAINLEVEL_OUTOF10: 6

## 2022-07-28 NOTE — ED NOTES
Pt to ER by self, ambulated to room, steady gait. Pt reports left side back pain and intermit sharp pain to left abd, and feeling dizzy and numbness. Pt reports she is about 6 weeks pregnant, also reports she test + COVID about 10 days ago. Pt reports dysuria yesterday, but states improved today but still reports freq/urgency. Pt rates pain 6/10, denies analgesics PTA.  Pt calm, cooperative, respers even non labored, skin warm pink, no distress, here for eval.      Rancho Galvan RN  07/28/22 4897

## 2022-07-29 NOTE — ED PROVIDER NOTES
[id number] [facility] ED  [address] [facility] [address] [facility] [address]  Phone: [phone]  Fax: [phone] [facility]          Pt Name: Juan Monae  MRN: [id number]  [place] 1995  Date of evaluation: 7/28/2022      CHIEF COMPLAINT       Chief Complaint   Patient presents with    Dizziness    Abdominal Pain     Left side today    Back Pain     Left side, all symptoms started today        HISTORY OF PRESENT ILLNESS       Juan Monae is a 32 y.o. female who presents with left suprapubic pain in addition to some dysuria and some intermittent mild flank discomfort. She reports he had COVID 9 days ago with the symptoms have resolved. She also recently found out she was pregnant and feels she is about 5 to 6 weeks gestational age. She is G1, P0. Denies any vaginal bleeding or discharge. She does report some nausea and diarrhea. No vomiting. She does feel like she is having an out of body experience that began around 7 AM today with her other symptoms. No fall or trauma. No headache. No neurologic symptoms such as numbness/weakness/paresthesias. No other symptoms or concerns. REVIEW OF SYSTEMS       Review of Systems   Constitutional:  Negative for chills, fatigue and fever. HENT:  Negative for rhinorrhea and sore throat. Eyes:  Negative for pain. Respiratory:  Negative for cough and shortness of breath. Cardiovascular:  Negative for chest pain. Gastrointestinal:  Positive for abdominal pain, diarrhea and nausea. Negative for constipation and vomiting. Genitourinary:  Negative for difficulty urinating. Musculoskeletal:  Negative for back pain and neck pain. Skin:  Negative for rash. Neurological:  Negative for weakness and headaches. PAST MEDICAL HISTORY    has a past medical history of Acid reflux, Anemia, Bipolar 1 disorder ([address] Depression, Dysmenorrhea, and Irritable bowel syndrome.     SURGICAL HISTORY      has no past surgical history on file. CURRENT MEDICATIONS       Current Discharge Medication List        CONTINUE these medications which have NOT CHANGED    Details   busPIRone (BUSPAR) 10 MG tablet Take 10 mg by mouth in the morning. sertraline (ZOLOFT) 100 MG tablet Take 100 mg by mouth in the morning. brexpiprazole (REXULTI) 1 MG TABS tablet Take 1 mg by mouth in the morning. ALLERGIES     is allergic to sulfamethoxazole-trimethoprim. FAMILY HISTORY     She indicated that her mother is alive. She indicated that her father is alive. She indicated that the status of her maternal grandmother is unknown. She indicated that the status of her paternal grandfather is unknown.     family history includes Arthritis in her mother; Cancer in her paternal grandfather; Diabetes in her father and maternal grandmother. SOCIAL HISTORY      reports that she has never smoked. She has quit using smokeless tobacco.  Her smokeless tobacco use included chew. She reports that she does not drink alcohol and does not use drugs. PHYSICAL EXAM     INITIAL VITALS:  height is 5' 2\" (1.575 m) and weight is 99.8 kg (220 lb). Her oral temperature is 98.4 °F (36.9 °C). Her blood pressure is 108/71 and her pulse is 76. Her respiration is 16 and oxygen saturation is 97%. Physical Exam  Vitals reviewed. Constitutional:       General: She is not in acute distress. Appearance: She is well-developed. She is not ill-appearing or toxic-appearing. HENT:      Head: Normocephalic and atraumatic. Right Ear: External ear normal.      Left Ear: External ear normal.   Eyes:      General: Lids are normal.      Conjunctiva/sclera: Conjunctivae normal.      Pupils: Pupils are equal, round, and reactive to light. Neck:      Trachea: No tracheal deviation. Cardiovascular:      Rate and Rhythm: Normal rate and regular rhythm. Pulmonary:      Effort: Pulmonary effort is normal. No respiratory distress.       Breath sounds: Normal breath sounds. Abdominal:      Palpations: Abdomen is soft. Tenderness: There is no abdominal tenderness. Comments: Mild left suprapubic tenderness. Otherwise benign abdominal exam.   Musculoskeletal:      Cervical back: Full passive range of motion without pain. No spinous process tenderness or muscular tenderness. Skin:     General: Skin is warm and dry. Neurological:      Mental Status: She is alert and oriented to person, place, and time. GCS: GCS eye subscore is 4. GCS verbal subscore is 5. GCS motor subscore is 6. Cranial Nerves: No cranial nerve deficit. Sensory: No sensory deficit. Coordination: Coordination normal.      Deep Tendon Reflexes: Reflexes are normal and symmetric. Psychiatric:         Speech: Speech normal.         DIFFERENTIAL DIAGNOSIS/ MDM:     Plan to be to check baseline labs and hydrate with fluids. Clinically she appears very well and otherwise not toxic or septic. Possible UTI that is causing her left abdomen and flank symptoms. Suspicion low for ureteral stone      DIAGNOSTIC RESULTS     EKG: All EKG's are interpreted by the Emergency Department Physician who either signs or Co-signs this chart in the absence of a cardiologist.        RADIOLOGY:   Interpretation per the Radiologist below, if available at the time of this note:  US PELVIS COMPLETE   Final Result   Single living intrauterine gestation with an estimated gestational age of 11   weeks, 5 days. Left ovary not visualized. US OB TRANSVAGINAL   Final Result   Single living intrauterine gestation with an estimated gestational age of 11   weeks, 5 days. Left ovary not visualized. No results found.     LABS:  Results for orders placed or performed during the hospital encounter of 07/28/22   Urinalysis with Reflex to Culture    Specimen: Urine, clean catch   Result Value Ref Range    Color, UA Yellow Yellow    Turbidity UA Clear Clear    Glucose, Ur NEGATIVE NEGATIVE 4.9 3.5 - 5.2 g/dL    Albumin/Globulin Ratio 2.2 1.0 - 2.5    GFR Non-African American >60 >60 mL/min    GFR African American >60 >60 mL/min    GFR Comment         Magnesium   Result Value Ref Range    Magnesium 1.9 1.6 - 2.6 mg/dL   Lipase   Result Value Ref Range    Lipase 31 13 - 60 U/L   HCG, Quantitative, Pregnancy   Result Value Ref Range    hCG Quant 15,086 (H) <5 mIU/mL   ABO/RH   Result Value Ref Range    ABO/Rh O POSITIVE        EMERGENCY DEPARTMENT COURSE:     The patient was given the following medications:  Orders Placed This Encounter   Medications    0.9 % sodium chloride bolus        Vitals:    Vitals:    07/28/22 1929 07/28/22 2124 07/28/22 2127   BP: 126/79  108/71   Pulse: 92 76    Resp: 12 16    Temp: 98.4 °F (36.9 °C)     TempSrc: Oral     SpO2: 99% 97%    Weight: 99.8 kg (220 lb)     Height: 5' 2\" (1.575 m)       -------------------------  BP: 108/71, Temp: 98.4 °F (36.9 °C), Heart Rate: 76, Resp: 16      Re-evaluation Notes    Patient doing well on reevaluation. Feels much better. His work-up is unremarkable. She is approximately 5 weeks gestational age without evidence of ectopic pregnancy. Left ovary is not visualized however I do not feel further investigation this evening is necessary as the patient is essentially asymptomatic at this time. I do not feel she has a torsion or tubo-ovarian abscess. Urinalysis looks not infected. She does have OB follow-up and advised to follow closely with her OB or return sooner if worsening or for new or concerning symptoms. She is comfortable with this plan. I estimate there is LOW risk for ACUTE APPENDICITIS, BOWEL OBSTRUCTION, CHOLECYSTITIS, DIVERTICULITIS, INCARCERATED HERNIA, PANCREATITIS, or PERFORATED BOWEL or ULCER, thus I consider the discharge disposition reasonable. Re-evaluation of the abdomen is benign. No guarding, peritoneal signs or significant tenderness noted. Also, there is no evidence or peritonitis, sepsis, or toxicity.  The patient and/or family and I have discussed the diagnosis and risks, and we agree with discharging home to follow-up with their primary doctor. The patient presents with abdominal pain without signs of peritonitis or other life-threatening or serious etiology. The patient appears stable for discharge and has been instructed to return immediately if the symptoms worsen in any way, or in 8-12 hr if not improved for re-evaluation. We also discussed returning to the Emergency Department immediately if new or worsening symptoms occur. We have discussed the symptoms which are most concerning (e.g., bloody stool, fever, changing or worsening pain, persistent vomiting, chest pain shortness of breath, numbness or weakness to the arms or legs, coolness or color change to the arms or legs) that necessitate immediate return. The patient understands that at this time there is no evidence for a more malignant underlying process, but the patient also understands that early in the process of an illness or injury, an emergency department workup can be falsely reassuring. Routine discharge counseling was given, and the patient understands that worsening, changing or persistent symptoms should prompt an immediate call or follow up with their primary physician or return to the emergency department. The importance of appropriate follow up was also discussed. I have reviewed the disposition diagnosis with the patient and or their family/guardian. I have answered their questions and given discharge instructions. They voiced understanding of these instructions and did not have any further questions or complaints. CONSULTS:    None    CRITICAL CARE:     None    PROCEDURES:    None    FINAL IMPRESSION      1. Less than 8 weeks gestation of pregnancy    2.  Abdominal pain, left lower quadrant          DISPOSITION/PLAN   DISPOSITION Decision To Discharge 07/28/2022 11:16:19 PM      Condition on Disposition    Improved    PATIENT REFERRED TO:  Apple Arellano  [address]  [phone]    Schedule an appointment as soon as possible for a visit in 2 days      DISCHARGE MEDICATIONS:  Current Discharge Medication List          (Please note that portions of this note were completed with a voice recognition program.  Efforts were made to edit the dictations but occasionally words are mis-transcribed.)    Rommel Person DO, DO  Attending Emergency Physician       Rommel Person DO  07/29/22 0157

## 2022-07-29 NOTE — DISCHARGE INSTRUCTIONS
Follow-up with your obstetrician in the next 2 days. Return right away for worsening symptoms, worsening pain, change in location type of pain, fevers, vomiting, any other new or concerning symptoms. PLEASE RETURN TO THE EMERGENCY DEPARTMENT IMMEDIATELY if your symptoms worsen in anyway or in 8-12 hours if not improved for re-evaluation. You should immediately return to the ER for symptoms such as increasing pain, bloody stool, fever, a feeling of passing out, light headed, dizziness, chest pain, shortness of breath, persistent nausea and/or vomiting, numbness or weakness to the arms or legs, coolness or color change of the arms or legs. Take your medication as indicated and prescribed. If you are given an antibiotic then, make sure you get the prescription filled and take the antibiotics until finished. Please understand that at this time there is no evidence for a more serious underlying process, but that early in the process of an illness or injury, an emergency department workup can be falsely reassuring. You should contact your family doctor within the next 24 hours for a follow up appointment    Giovanni Mauro!!!    From [employer] and [address] [place] [address] Emergency Services    On behalf of the Emergency Department staff at [employer], I would like to thank you for giving us the opportunity to address your health care needs and concerns. We hope that during your visit, our service was delivered in a professional and caring manner. Please keep [employer] in mind as we walk with you down the path to your own personal wellness. Please expect an automated text message or email from us so we can ask a few questions about your health and progress. Based on your answers, a clinician may call you back to offer help and instructions. Please understand that early in the process of an illness or injury, an emergency department workup can be falsely reassuring.   If you notice any worsening, changing or

## 2022-08-11 ENCOUNTER — HOSPITAL ENCOUNTER (OUTPATIENT)
Age: 27
Setting detail: SPECIMEN
Discharge: HOME | End: 2022-08-11

## 2022-08-11 ENCOUNTER — INITIAL PRENATAL (OUTPATIENT)
Dept: OBGYN CLINIC | Age: 27
End: 2022-08-11

## 2022-08-11 VITALS
WEIGHT: 222.25 LBS | BODY MASS INDEX: 40.65 KG/M2 | DIASTOLIC BLOOD PRESSURE: 74 MMHG | SYSTOLIC BLOOD PRESSURE: 116 MMHG

## 2022-08-11 DIAGNOSIS — O09.90 HIGH RISK PREGNANCY, ANTEPARTUM: Primary | ICD-10-CM

## 2022-08-11 DIAGNOSIS — O09.90 HIGH RISK PREGNANCY, ANTEPARTUM: ICD-10-CM

## 2022-08-11 DIAGNOSIS — O21.9 NAUSEA AND VOMITING IN PREGNANCY: ICD-10-CM

## 2022-08-11 DIAGNOSIS — O99.340 BIPOLAR DISEASE DURING PREGNANCY, ANTEPARTUM (HCC): ICD-10-CM

## 2022-08-11 DIAGNOSIS — Z3A.01 7 WEEKS GESTATION OF PREGNANCY: ICD-10-CM

## 2022-08-11 DIAGNOSIS — O99.210 OBESITY AFFECTING PREGNANCY, ANTEPARTUM: ICD-10-CM

## 2022-08-11 DIAGNOSIS — F31.9 BIPOLAR DISEASE DURING PREGNANCY, ANTEPARTUM (HCC): ICD-10-CM

## 2022-08-11 PROBLEM — R56.9 SEIZURE (HCC): Status: RESOLVED | Noted: 2021-05-03 | Resolved: 2022-08-11

## 2022-08-11 LAB
AMPHETAMINE SCREEN URINE: NEGATIVE
BARBITURATE SCREEN URINE: NEGATIVE
BENZODIAZEPINE SCREEN, URINE: NEGATIVE
CANNABINOID SCREEN URINE: POSITIVE
COCAINE METABOLITE, URINE: NEGATIVE
FENTANYL URINE: NEGATIVE
METHADONE SCREEN, URINE: NEGATIVE
OPIATES, URINE: NEGATIVE
OXYCODONE SCREEN URINE: NEGATIVE
PHENCYCLIDINE, URINE: NEGATIVE
TEST INFORMATION: ABNORMAL

## 2022-08-11 PROCEDURE — 0502F SUBSEQUENT PRENATAL CARE: CPT | Performed by: ADVANCED PRACTICE MIDWIFE

## 2022-08-11 RX ORDER — TRAZODONE HYDROCHLORIDE 50 MG/1
TABLET ORAL
COMMUNITY
Start: 2022-07-27

## 2022-08-11 RX ORDER — PYRIDOXINE HCL (VITAMIN B6) 25 MG
25 TABLET ORAL DAILY
Qty: 30 TABLET | Refills: 0 | Status: SHIPPED | OUTPATIENT
Start: 2022-08-11 | End: 2023-08-11

## 2022-08-11 RX ORDER — BREXPIPRAZOLE 0.5 MG/1
TABLET ORAL
COMMUNITY
Start: 2022-08-01
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
## 2022-08-12 LAB
C. TRACHOMATIS DNA ,URINE: NEGATIVE
CULTURE: NORMAL
N. GONORRHOEAE DNA, URINE: NEGATIVE
SPECIMEN DESCRIPTION: NORMAL
SPECIMEN DESCRIPTION: NORMAL

## 2022-08-20 ENCOUNTER — APPOINTMENT (OUTPATIENT)
Dept: ULTRASOUND IMAGING | Age: 27
End: 2022-08-20
Payer: COMMERCIAL

## 2022-08-20 ENCOUNTER — HOSPITAL ENCOUNTER (EMERGENCY)
Age: 27
Discharge: HOME | End: 2022-08-20
Attending: EMERGENCY MEDICINE
Payer: COMMERCIAL

## 2022-08-20 VITALS
HEIGHT: 62 IN | SYSTOLIC BLOOD PRESSURE: 126 MMHG | RESPIRATION RATE: 18 BRPM | DIASTOLIC BLOOD PRESSURE: 78 MMHG | BODY MASS INDEX: 45.08 KG/M2 | TEMPERATURE: 98 F | OXYGEN SATURATION: 99 % | HEART RATE: 89 BPM | WEIGHT: 245 LBS

## 2022-08-20 DIAGNOSIS — O20.9 VAGINAL BLEEDING IN PREGNANCY, FIRST TRIMESTER: Primary | ICD-10-CM

## 2022-08-20 LAB
ABSOLUTE EOS #: 0.3 K/UL (ref 0–0.4)
ABSOLUTE LYMPH #: 2 K/UL (ref 1–4.8)
ABSOLUTE MONO #: 0.7 K/UL (ref 0.1–1.2)
ANION GAP SERPL CALCULATED.3IONS-SCNC: 10 MMOL/L (ref 9–17)
BASOPHILS # BLD: 1 % (ref 0–2)
BASOPHILS ABSOLUTE: 0.1 K/UL (ref 0–0.2)
BILIRUBIN URINE: NEGATIVE
BUN BLDV-MCNC: 6 MG/DL (ref 6–20)
CALCIUM SERPL-MCNC: 9.6 MG/DL (ref 8.6–10.4)
CANDIDA SPECIES, DNA PROBE: NEGATIVE
CHLORIDE BLD-SCNC: 102 MMOL/L (ref 98–107)
CO2: 23 MMOL/L (ref 20–31)
COLOR: YELLOW
COMMENT UA: NORMAL
CREAT SERPL-MCNC: 0.55 MG/DL (ref 0.5–0.9)
EOSINOPHILS RELATIVE PERCENT: 3 % (ref 1–4)
GARDNERELLA VAGINALIS, DNA PROBE: NEGATIVE
GFR AFRICAN AMERICAN: >60 ML/MIN
GFR NON-AFRICAN AMERICAN: >60 ML/MIN
GFR SERPL CREATININE-BSD FRML MDRD: NORMAL ML/MIN/{1.73_M2}
GLUCOSE BLD-MCNC: 87 MG/DL (ref 70–99)
GLUCOSE URINE: NEGATIVE
HCG QUANTITATIVE: ABNORMAL MIU/ML
HCT VFR BLD CALC: 37.7 % (ref 36–46)
HEMOGLOBIN: 12.9 G/DL (ref 12–16)
KETONES, URINE: NEGATIVE
LEUKOCYTE ESTERASE, URINE: NEGATIVE
LYMPHOCYTES # BLD: 21 % (ref 24–44)
MCH RBC QN AUTO: 32 PG (ref 26–34)
MCHC RBC AUTO-ENTMCNC: 34.3 G/DL (ref 31–37)
MCV RBC AUTO: 93.5 FL (ref 80–100)
MONOCYTES # BLD: 7 % (ref 2–11)
NITRITE, URINE: NEGATIVE
PDW BLD-RTO: 13.5 % (ref 12.5–15.4)
PH UA: 5.5 (ref 5–8)
PLATELET # BLD: 364 K/UL (ref 140–450)
PMV BLD AUTO: 7.4 FL (ref 6–12)
POTASSIUM SERPL-SCNC: 4.2 MMOL/L (ref 3.7–5.3)
PROTEIN UA: NEGATIVE
RBC # BLD: 4.03 M/UL (ref 4–5.2)
SEG NEUTROPHILS: 68 % (ref 36–66)
SEGMENTED NEUTROPHILS ABSOLUTE COUNT: 6.4 K/UL (ref 1.8–7.7)
SODIUM BLD-SCNC: 135 MMOL/L (ref 135–144)
SOURCE: NORMAL
SPECIFIC GRAVITY UA: 1.01 (ref 1–1.03)
TRICHOMONAS VAGINALIS DNA: NEGATIVE
TURBIDITY: CLEAR
URINE HGB: NEGATIVE
UROBILINOGEN, URINE: NORMAL
WBC # BLD: 9.3 K/UL (ref 3.5–11)

## 2022-08-20 PROCEDURE — 76801 OB US < 14 WKS SINGLE FETUS: CPT

## 2022-08-20 PROCEDURE — 87510 GARDNER VAG DNA DIR PROBE: CPT

## 2022-08-20 PROCEDURE — 36415 COLL VENOUS BLD VENIPUNCTURE: CPT

## 2022-08-20 PROCEDURE — 87480 CANDIDA DNA DIR PROBE: CPT

## 2022-08-20 PROCEDURE — 80048 BASIC METABOLIC PNL TOTAL CA: CPT

## 2022-08-20 PROCEDURE — 99284 EMERGENCY DEPT VISIT MOD MDM: CPT

## 2022-08-20 PROCEDURE — 85025 COMPLETE CBC W/AUTO DIFF WBC: CPT

## 2022-08-20 PROCEDURE — 84702 CHORIONIC GONADOTROPIN TEST: CPT

## 2022-08-20 PROCEDURE — 87660 TRICHOMONAS VAGIN DIR PROBE: CPT

## 2022-08-20 PROCEDURE — 81003 URINALYSIS AUTO W/O SCOPE: CPT

## 2022-08-20 ASSESSMENT — PAIN DESCRIPTION - ORIENTATION: ORIENTATION: LOWER

## 2022-08-20 ASSESSMENT — PAIN - FUNCTIONAL ASSESSMENT: PAIN_FUNCTIONAL_ASSESSMENT: 0-10

## 2022-08-20 ASSESSMENT — PAIN DESCRIPTION - LOCATION: LOCATION: BACK

## 2022-08-20 ASSESSMENT — PAIN SCALES - GENERAL: PAINLEVEL_OUTOF10: 5

## 2022-08-20 NOTE — ED PROVIDER NOTES
[address] [facility] ED  [address] [facility] [place] [address]  Phone: [phone]  Fax: [phone]      Pt Name: Shannen Farmer  QEW:[id number]  [place] 1995  Date of evaluation: 2022      CHIEF COMPLAINT       Chief Complaint   Patient presents with    Vaginal Discharge     Pt. States being 9wks pregnant and having and pink/browish discharge that started this morning    Back Pain     Pt. States having lower back pain for a few days        HISTORY OF PRESENT ILLNESS   77-year-old  who is 9 weeks pregnant today by LMP confirmed by ultrasound presents to the emergency department today complaining of vaginal discharge which she describes as spotting. Over the past couple of days she has had some lower back pain. She tells me that her blood type is O+. She denies any abdominal pain. No dysuria hesitancy but she does report some urinary frequency. She called her OB/GYN who told her to come into the emergency department. She reports that she only notices the discharge which is brownish and pink she wipes after urinating. REVIEW OF SYSTEMS     Review of Systems   All other systems reviewed and are negative. PAST MEDICAL HISTORY    has a past medical history of Acid reflux, Anemia, Bipolar 1 disorder ([address] Depression, Dysmenorrhea, Irritable bowel syndrome, and Overactive bladder. SURGICAL HISTORY      has a past surgical history that includes Colposcopy. CURRENT MEDICATIONS       Current Discharge Medication List        CONTINUE these medications which have NOT CHANGED    Details   pyridoxine (B-6) 25 MG tablet Take 1 tablet by mouth in the morning.   Qty: 30 tablet, Refills: 0    Associated Diagnoses: Nausea and vomiting in pregnancy      doxyLAMINE succinate (GNP SLEEP AID) 25 MG tablet Take 1 tablet by mouth nightly  Qty: 30 tablet, Refills: 1    Associated Diagnoses: Nausea and vomiting in pregnancy      traZODone (DESYREL) 50 MG tablet     Associated Diagnoses: Bipolar disease during pregnancy, antepartum ([facility] [address]      REXULTI 0.5 MG TABS tablet     Associated Diagnoses: Bipolar disease during pregnancy, antepartum ([facility] [address]      busPIRone (BUSPAR) 10 MG tablet Take 10 mg by mouth in the morning. sertraline (ZOLOFT) 100 MG tablet Take 100 mg by mouth in the morning. ALLERGIES     is allergic to sulfamethoxazole-trimethoprim. FAMILY HISTORY     She indicated that her mother is alive. She indicated that her father is alive. She indicated that the status of her maternal grandmother is unknown. She indicated that the status of her paternal grandfather is unknown.     family history includes Arthritis in her mother; Cancer in her paternal grandfather; Diabetes in her father and maternal grandmother. SOCIAL HISTORY      reports that she has never smoked. She has quit using smokeless tobacco.  Her smokeless tobacco use included chew. She reports that she does not currently use alcohol. She reports that she does not use drugs. PHYSICAL EXAM     INITIAL VITALS:  height is 5' 2\" (1.575 m) and weight is 111.1 kg (245 lb). Her oral temperature is 98 °F (36.7 °C). Her blood pressure is 126/78 and her pulse is 89. Her respiration is 18 and oxygen saturation is 99%. Physical Exam  Vitals reviewed. Constitutional:       General: She is not in acute distress. Appearance: She is well-developed. HENT:      Head: Normocephalic and atraumatic. Eyes:      Conjunctiva/sclera: Conjunctivae normal.      Pupils: Pupils are equal, round, and reactive to light. Neck:      Trachea: No tracheal deviation. Cardiovascular:      Rate and Rhythm: Normal rate and regular rhythm. Pulmonary:      Effort: No respiratory distress. Breath sounds: Normal breath sounds. Abdominal:      General: Bowel sounds are normal. There is no distension. Palpations: Abdomen is soft. Tenderness: There is no abdominal tenderness. Genitourinary:     Comments:  There is some brownish material in the vaginal vault. Cervix closed. No cervical motion tenderness. Musculoskeletal:         General: No tenderness. Normal range of motion. Cervical back: Normal range of motion and neck supple. Skin:     General: Skin is warm and dry. Neurological:      Mental Status: She is alert and oriented to person, place, and time. Psychiatric:         Behavior: Behavior normal.         Thought Content: Thought content normal.         Judgment: Judgment normal.         DIFFERENTIAL DIAGNOSIS/ MDM:   Evaluation for ectopic pregnancy has been initiated. DIAGNOSTIC RESULTS     EKG:  None    RADIOLOGY:   US OB LESS THAN 14 WEEKS SINGLE OR FIRST GESTATION    Result Date: 8/18/2022  Exam Date: 8/11/2022 LATANYA: 3/26/2023 Early IUP  Yolk sac visualized Subchorionic Hematoma: none     US OB TRANSVAGINAL    Result Date: 7/28/2022  EXAMINATION: PELVIC ULTRASOUND; FIRST TRIMESTER OBSTETRIC ULTRASOUND 7/28/2022 TECHNIQUE: Transabdominal and transvaginal pelvic duplex ultrasound using B-mode/gray scaled imaging, Doppler spectral analysis and color flow Doppler was obtained. COMPARISON: None HISTORY: ORDERING SYSTEM PROVIDED HISTORY: rule out ectopic pregnancy, abdominal pain TECHNOLOGIST PROVIDED HISTORY: rule out ectopic pregnancy, abdominal pain; ORDERING SYSTEM PROVIDED HISTORY: r/o ectopic TECHNOLOGIST PROVIDED HISTORY: r/o ectopic FINDINGS: Uterus: 6.5 x 4.6 x 3.7 cm. Normal in size and echogenicity. Gestational Sac(s):  Single normal appearing gestational sac. No evidence of subchorionic hemorrhage. Yolk Sac:  Present with mean sac diameter of 1.28 cm. Fetal Pole:  Single fetal pole Crown Rump Length:  0.3 cm Fetal Heart Rate:  108 beats per minute Right ovary: Normal in size, echogenicity and blood flow measuring 2.9 x 1.9 x 2.0 cm. Corpus luteum cyst is noted. Normal Doppler flow noted. Left ovary: Not visualized. Free fluid: None.  Measurements: Estimated gestational age by current ultrasound: 11 weeks, 5 days Estimated gestational by LMP/prior ultrasound: 5 weeks, 4 days Estimated Due Date: 03/25/2023     Single living intrauterine gestation with an estimated gestational age of 10 weeks, 5 days. Left ovary not visualized. US PELVIS COMPLETE    Result Date: 7/28/2022  EXAMINATION: PELVIC ULTRASOUND; FIRST TRIMESTER OBSTETRIC ULTRASOUND 7/28/2022 TECHNIQUE: Transabdominal and transvaginal pelvic duplex ultrasound using B-mode/gray scaled imaging, Doppler spectral analysis and color flow Doppler was obtained. COMPARISON: None HISTORY: ORDERING SYSTEM PROVIDED HISTORY: rule out ectopic pregnancy, abdominal pain TECHNOLOGIST PROVIDED HISTORY: rule out ectopic pregnancy, abdominal pain; ORDERING SYSTEM PROVIDED HISTORY: r/o ectopic TECHNOLOGIST PROVIDED HISTORY: r/o ectopic FINDINGS: Uterus: 6.5 x 4.6 x 3.7 cm. Normal in size and echogenicity. Gestational Sac(s):  Single normal appearing gestational sac. No evidence of subchorionic hemorrhage. Yolk Sac:  Present with mean sac diameter of 1.28 cm. Fetal Pole:  Single fetal pole Crown Rump Length:  0.3 cm Fetal Heart Rate:  108 beats per minute Right ovary: Normal in size, echogenicity and blood flow measuring 2.9 x 1.9 x 2.0 cm. Corpus luteum cyst is noted. Normal Doppler flow noted. Left ovary: Not visualized. Free fluid: None. Measurements: Estimated gestational age by current ultrasound: 5 weeks, 5 days Estimated gestational by LMP/prior ultrasound: 5 weeks, 4 days Estimated Due Date: 03/25/2023     Single living intrauterine gestation with an estimated gestational age of 10 weeks, 5 days. Left ovary not visualized. LABS:  Results for orders placed or performed during the hospital encounter of 08/20/22   Vaginitis DNA Probe    Specimen: Vaginal   Result Value Ref Range    Source . VAGINAL SWAB     Trichomonas Vaginalis DNA NEGATIVE NEGATIVE    GARDNERELLA VAGINALIS, DNA PROBE NEGATIVE NEGATIVE    CANDIDA SPECIES, DNA PROBE NEGATIVE NEGATIVE   BMP Result Value Ref Range    Glucose 87 70 - 99 mg/dL    BUN 6 6 - 20 mg/dL    Creatinine 0.55 0.50 - 0.90 mg/dL    Calcium 9.6 8.6 - 10.4 mg/dL    Sodium 135 135 - 144 mmol/L    Potassium 4.2 3.7 - 5.3 mmol/L    Chloride 102 98 - 107 mmol/L    CO2 23 20 - 31 mmol/L    Anion Gap 10 9 - 17 mmol/L    GFR Non-African American >60 >60 mL/min    GFR African American >60 >60 mL/min    GFR Comment         CBC with Auto Differential   Result Value Ref Range    WBC 9.3 3.5 - 11.0 k/uL    RBC 4.03 4.0 - 5.2 m/uL    Hemoglobin 12.9 12.0 - 16.0 g/dL    Hematocrit 37.7 36 - 46 %    MCV 93.5 80 - 100 fL    MCH 32.0 26 - 34 pg    MCHC 34.3 31 - 37 g/dL    RDW 13.5 12.5 - 15.4 %    Platelets 742 817 - 999 k/uL    MPV 7.4 6.0 - 12.0 fL    Seg Neutrophils 68 (H) 36 - 66 %    Lymphocytes 21 (L) 24 - 44 %    Monocytes 7 2 - 11 %    Eosinophils % 3 1 - 4 %    Basophils 1 0 - 2 %    Segs Absolute 6.40 1.8 - 7.7 k/uL    Absolute Lymph # 2.00 1.0 - 4.8 k/uL    Absolute Mono # 0.70 0.1 - 1.2 k/uL    Absolute Eos # 0.30 0.0 - 0.4 k/uL    Basophils Absolute 0.10 0.0 - 0.2 k/uL   HCG, Quantitative, Pregnancy   Result Value Ref Range    hCG Quant 971,652 (H) <5 mIU/mL   Urinalysis   Result Value Ref Range    Color, UA Yellow Yellow    Turbidity UA Clear Clear    Glucose, Ur NEGATIVE NEGATIVE    Bilirubin Urine NEGATIVE NEGATIVE    Ketones, Urine NEGATIVE NEGATIVE    Specific Gravity, UA 1.009 1.005 - 1.030    Urine Hgb NEGATIVE NEGATIVE    pH, UA 5.5 5.0 - 8.0    Protein, UA NEGATIVE NEGATIVE    Urobilinogen, Urine Normal Normal    Nitrite, Urine NEGATIVE NEGATIVE    Leukocyte Esterase, Urine NEGATIVE NEGATIVE    Urinalysis Comments       Microscopic exam not performed based on chemical results unless requested in original order. EMERGENCY DEPARTMENT COURSE:     Thepatient was given the following medications:  No orders of the defined types were placed in this encounter.        Vitals:    Vitals:    08/20/22 1104   BP: 126/78   Pulse: 89

## 2022-08-20 NOTE — ED NOTES
Patient ambulated to room. Patient states having browish/pink discharge that she noticed this morning. Patient also states having lower back pain for a couple of days. Patient denies taking any thing for the pain. Patient denies any vaginal bleeding or abdominal cramping. Patient rates her back pain a 5 out of 10 on the pain scale.       Kary Hale LPN  50/03/57 0618

## 2022-08-30 ENCOUNTER — HOSPITAL ENCOUNTER (OUTPATIENT)
Age: 27
Setting detail: SPECIMEN
Discharge: HOME | End: 2022-08-30

## 2022-08-30 ENCOUNTER — ROUTINE PRENATAL (OUTPATIENT)
Dept: OBGYN CLINIC | Age: 27
End: 2022-08-30

## 2022-08-30 VITALS — BODY MASS INDEX: 40.6 KG/M2 | WEIGHT: 222 LBS | SYSTOLIC BLOOD PRESSURE: 132 MMHG | DIASTOLIC BLOOD PRESSURE: 80 MMHG

## 2022-08-30 DIAGNOSIS — Z3A.10 10 WEEKS GESTATION OF PREGNANCY: ICD-10-CM

## 2022-08-30 DIAGNOSIS — R10.9 ABDOMINAL PAIN DURING PREGNANCY IN FIRST TRIMESTER: ICD-10-CM

## 2022-08-30 DIAGNOSIS — O09.90 HIGH RISK PREGNANCY, ANTEPARTUM: Primary | ICD-10-CM

## 2022-08-30 DIAGNOSIS — O26.891 ABDOMINAL PAIN DURING PREGNANCY IN FIRST TRIMESTER: ICD-10-CM

## 2022-08-30 LAB
CANDIDA SPECIES, DNA PROBE: NEGATIVE
GARDNERELLA VAGINALIS, DNA PROBE: NEGATIVE
SOURCE: NORMAL
TRICHOMONAS VAGINALIS DNA: NEGATIVE

## 2022-08-30 PROCEDURE — 0502F SUBSEQUENT PRENATAL CARE: CPT | Performed by: ADVANCED PRACTICE MIDWIFE

## 2022-08-30 NOTE — PROGRESS NOTES
SUBJECTIVE:    Ketty Benjamin is here for EXTRA OB visitadmits to concerns today. Feeling general malaise and fatigue some cramping. She denies  feeling fetal movement. She denies vaginal bleeding. She denies vaginal discharge. She denies leaking of fluid. She denies present cramping. She denies  nausea and/or vomiting. OBJECTIVE:  Blood pressure 132/80, weight 222 lb (100.7 kg), last menstrual period 06/19/2022, currently breastfeeding. Total weight gain: 0 lb (0 kg)    Physical Exam  Genitourinary:      Right Labia: No rash, tenderness, lesions or skin changes. Left Labia: No tenderness, lesions, skin changes or rash. No cervical friability. Cervical exam comments: Cervix visually closed. .         ASSESSMENT/PLAN:  IUP @ 10+2 weeks  S =D    High Risk Pregnancy  Due date is based on LMP and confirmed with LMP early dating ultrasound  Patient's prenatal labs are not completed. Patient's blood type O positive and rhogam is not indicated in pregnancy. Early glucola indicated: yes - not yet completed  Patient accepted genetic screening. NIPS  ordered  Anatomy scan scheduled 11/7/22      2. 10 weeks gestation of pregnancy  - Recommend to increase hydration  - Reviewed warning signs     3. Abdominal pain during pregnancy in first trimester  - Vaginitis DNA Probe; Future  - Culture, Urine; Future      She was counseled regarding all of the above:    Return if symptoms worsen or fail to improve.     The patient, Darian Barrera,  was seen with a total time spent of 25 minutes for the visit on this date of service by the [facility]  On this date August 30, 2022,  I have spent greater than 50% of this visit:  [x] reviewing previous notes  [x] reviewing test results  [x] pre-charting  Discussed with patient:  [x] Importance of compliance with treatment plan  [x] Counseling  [] Coordinating care  [x] Documenting     Electronically Signed By: Edwin Thomas

## 2022-08-31 LAB
CULTURE: NORMAL
SPECIMEN DESCRIPTION: NORMAL

## 2022-09-08 ENCOUNTER — HOSPITAL ENCOUNTER (OUTPATIENT)
Age: 27
Setting detail: SPECIMEN
Discharge: HOME | End: 2022-09-08

## 2022-09-08 ENCOUNTER — ROUTINE PRENATAL (OUTPATIENT)
Dept: OBGYN CLINIC | Age: 27
End: 2022-09-08
Payer: COMMERCIAL

## 2022-09-08 VITALS — BODY MASS INDEX: 40.6 KG/M2 | WEIGHT: 222 LBS | SYSTOLIC BLOOD PRESSURE: 122 MMHG | DIASTOLIC BLOOD PRESSURE: 82 MMHG

## 2022-09-08 DIAGNOSIS — O99.210 OBESITY AFFECTING PREGNANCY, ANTEPARTUM: ICD-10-CM

## 2022-09-08 DIAGNOSIS — F31.9 BIPOLAR DISEASE DURING PREGNANCY, ANTEPARTUM (HCC): ICD-10-CM

## 2022-09-08 DIAGNOSIS — Z3A.11 11 WEEKS GESTATION OF PREGNANCY: ICD-10-CM

## 2022-09-08 DIAGNOSIS — O99.340 BIPOLAR DISEASE DURING PREGNANCY, ANTEPARTUM (HCC): ICD-10-CM

## 2022-09-08 DIAGNOSIS — Z23 NEEDS FLU SHOT: ICD-10-CM

## 2022-09-08 DIAGNOSIS — O09.90 HIGH RISK PREGNANCY, ANTEPARTUM: Primary | ICD-10-CM

## 2022-09-08 DIAGNOSIS — O09.90 HIGH RISK PREGNANCY, ANTEPARTUM: ICD-10-CM

## 2022-09-08 LAB
ABO/RH: NORMAL
ABSOLUTE EOS #: 0.32 K/UL (ref 0–0.44)
ABSOLUTE IMMATURE GRANULOCYTE: 0.04 K/UL (ref 0–0.3)
ABSOLUTE LYMPH #: 2.88 K/UL (ref 1.1–3.7)
ABSOLUTE MONO #: 0.71 K/UL (ref 0.1–1.2)
ANTIBODY SCREEN: NEGATIVE
BASOPHILS # BLD: 1 % (ref 0–2)
BASOPHILS ABSOLUTE: 0.05 K/UL (ref 0–0.2)
EOSINOPHILS RELATIVE PERCENT: 4 % (ref 1–4)
HCT VFR BLD CALC: 38.8 % (ref 36.3–47.1)
HEMOGLOBIN: 12.8 G/DL (ref 11.9–15.1)
HEPATITIS B SURFACE ANTIGEN: NONREACTIVE
HEPATITIS C ANTIBODY: NONREACTIVE
HIV AG/AB: NONREACTIVE
IMMATURE GRANULOCYTES: 0 %
LYMPHOCYTES # BLD: 32 % (ref 24–43)
MCH RBC QN AUTO: 32.3 PG (ref 25.2–33.5)
MCHC RBC AUTO-ENTMCNC: 33 G/DL (ref 28.4–34.8)
MCV RBC AUTO: 98 FL (ref 82.6–102.9)
MONOCYTES # BLD: 8 % (ref 3–12)
NRBC AUTOMATED: 0 PER 100 WBC
PDW BLD-RTO: 13.1 % (ref 11.8–14.4)
PLATELET # BLD: 399 K/UL (ref 138–453)
PMV BLD AUTO: 10 FL (ref 8.1–13.5)
RBC # BLD: 3.96 M/UL (ref 3.95–5.11)
RUBV IGG SER QL: 19.2 IU/ML
SEG NEUTROPHILS: 55 % (ref 36–65)
SEGMENTED NEUTROPHILS ABSOLUTE COUNT: 5.15 K/UL (ref 1.5–8.1)
T. PALLIDUM, IGG: NONREACTIVE
WBC # BLD: 9.2 K/UL (ref 3.5–11.3)

## 2022-09-08 PROCEDURE — 0502F SUBSEQUENT PRENATAL CARE: CPT | Performed by: ADVANCED PRACTICE MIDWIFE

## 2022-09-08 PROCEDURE — 90674 CCIIV4 VAC NO PRSV 0.5 ML IM: CPT | Performed by: ADVANCED PRACTICE MIDWIFE

## 2022-09-08 PROCEDURE — 90471 IMMUNIZATION ADMIN: CPT | Performed by: ADVANCED PRACTICE MIDWIFE

## 2022-09-08 NOTE — PROGRESS NOTES
SUBJECTIVE:    Elizabeth He is here for her return OB visit. denies concerns today. She denies  feeling fetal movement. She denies vaginal bleeding. She denies vaginal discharge. She denies leaking of fluid. She denies uterine cramping. She denies  nausea and/or vomiting. OBJECTIVE:  Blood pressure 122/82, weight 222 lb (100.7 kg), last menstrual period 06/19/2022, currently breastfeeding. Total weight gain: 0 lb (0 kg)        ASSESSMENT/PLAN:  IUP @ 11+4 weeks  S =D    High Risk Pregnancy  Due date is based on LMP and confirmed with LMP early dating ultrasound  Patient's prenatal labs are not completed. Patient's blood type O positive and rhogam is not indicated in pregnancy. Early glucola indicated: yes - not yet completed  Patient accepted genetic screening. NIPS normal  Anatomy scan scheduled 11/7/22    2. 11 weeks gestation of pregnancy  - Reviewed warning signs    3. Obesity affecting pregnancy, antepartum    4. Bipolar disease during pregnancy, antepartum ([facility] [address]    5. Needs flu shot  - Influenza, FLUCELVAX, (age 10 mo+), IM, Preservative Free, 0.5 mL  - VT INJECTION,THERAP/PROPH/DIAGNOST, IM OR SUBCUT  Vaccine information statement influenza (inactivated) edition 8/6/21given to patient on 9/8/2022    She was counseled regarding all of the above:    Return in about 4 weeks (around 10/6/2022) for Return OB.     The patient, Drew Summers,  was seen with a total time spent of 25 minutes for the visit on this date of service by the [facility]  On this date September 8, 2022,  I have spent greater than 50% of this visit:  [x] reviewing previous notes  [x] reviewing test results  [x] pre-charting  Discussed with patient:  [x] Importance of compliance with treatment plan  [x] Counseling  [] Coordinating care  [x] Documenting     Electronically Signed By: Edwin Rogers

## 2022-09-09 LAB — VZV IGG SER QL IA: 1.22

## 2022-10-05 ENCOUNTER — ROUTINE PRENATAL (OUTPATIENT)
Dept: OBGYN CLINIC | Age: 27
End: 2022-10-05
Payer: MEDICAID

## 2022-10-05 VITALS — BODY MASS INDEX: 40.97 KG/M2 | DIASTOLIC BLOOD PRESSURE: 70 MMHG | WEIGHT: 224 LBS | SYSTOLIC BLOOD PRESSURE: 122 MMHG

## 2022-10-05 DIAGNOSIS — O99.210 OBESITY AFFECTING PREGNANCY, ANTEPARTUM: ICD-10-CM

## 2022-10-05 DIAGNOSIS — Z3A.15 15 WEEKS GESTATION OF PREGNANCY: ICD-10-CM

## 2022-10-05 DIAGNOSIS — F31.9 BIPOLAR DISEASE DURING PREGNANCY, ANTEPARTUM (HCC): ICD-10-CM

## 2022-10-05 DIAGNOSIS — O09.90 HIGH RISK PREGNANCY, ANTEPARTUM: Primary | ICD-10-CM

## 2022-10-05 DIAGNOSIS — O99.340 BIPOLAR DISEASE DURING PREGNANCY, ANTEPARTUM (HCC): ICD-10-CM

## 2022-10-05 PROCEDURE — 99213 OFFICE O/P EST LOW 20 MIN: CPT | Performed by: ADVANCED PRACTICE MIDWIFE

## 2022-10-05 NOTE — PROGRESS NOTES
SUBJECTIVE:    [employer] is here for her return OB visit. admits to concerns today. A lump she noticed by her clitoris   She denies  feeling fetal movement. She denies vaginal bleeding. She denies vaginal discharge. She denies leaking of fluid. She denies uterine cramping. She denies  nausea and/or vomiting. OBJECTIVE:  Blood pressure 122/70, weight 224 lb (101.6 kg), last menstrual period 06/19/2022, currently breastfeeding. Total weight gain: 2 lb (0.907 kg)    [employer] accepted the flu vaccine as appropriate. ASSESSMENT/PLAN:  IUP @ 15+3 weeks  S =D    High Risk Pregnancy  Due date is based on LMP and confirmed with 7w3d early dating ultrasound  Patient's prenatal labs are completed. Patient's blood type O positive and rhogam is not indicated in pregnancy. Early glucola indicated: yes - not completed  Patient accepted genetic screening. NIPS normal  Anatomy scan scheduled for 11/7/22  Glucola between 24-28 weeks. Reviewed  Total weight gain in pregnancy reviewed: Yes  Fetal movement reviewed      2. 15 weeks gestation of pregnancy  - Reviewed warning signs    3. Obesity affecting pregnancy, antepartum  - Baby ASA  - Early glucola not completed    4. Bipolar disease during pregnancy, antepartum ([facility] [address]  - Reports continues to take rexluti, zolfot and buspar and reports she feels stable on this regimen. [facility] referral sent        She was counseled regarding all of the above:    Return in about 4 weeks (around 11/2/2022) for with Maria Luisa [facility].     The patient, Saleem Segura,  was seen with a total time spent of 25 minutes for the visit on this date of service by the [facility]  On this date October 5, 2022,  I have spent greater than 50% of this visit:  [x] reviewing previous notes  [x] reviewing test results  [x] pre-charting  Discussed with patient:  [x] Importance of compliance with treatment plan  [x] Counseling  [] Coordinating care  [x] Documenting     Electronically Signed By: Edwin Guerin

## 2022-11-07 ENCOUNTER — HOSPITAL ENCOUNTER (OUTPATIENT)
Age: 27
Setting detail: SPECIMEN
Discharge: HOME | End: 2022-11-07
Payer: MEDICAID

## 2022-11-07 ENCOUNTER — ROUTINE PRENATAL (OUTPATIENT)
Dept: PERINATAL CARE | Age: 27
End: 2022-11-07
Payer: MEDICAID

## 2022-11-07 VITALS
TEMPERATURE: 97.2 F | DIASTOLIC BLOOD PRESSURE: 64 MMHG | SYSTOLIC BLOOD PRESSURE: 114 MMHG | RESPIRATION RATE: 16 BRPM | HEART RATE: 70 BPM | HEIGHT: 62 IN | BODY MASS INDEX: 42.33 KG/M2 | WEIGHT: 230 LBS

## 2022-11-07 DIAGNOSIS — Z3A.20 20 WEEKS GESTATION OF PREGNANCY: ICD-10-CM

## 2022-11-07 DIAGNOSIS — O99.212 OBESITY AFFECTING PREGNANCY IN SECOND TRIMESTER: ICD-10-CM

## 2022-11-07 DIAGNOSIS — O44.40 LOW IMPLANTATION OF PLACENTA WITHOUT HEMORRHAGE: ICD-10-CM

## 2022-11-07 DIAGNOSIS — Z36.86 ENCOUNTER FOR SCREENING FOR RISK OF PRE-TERM LABOR: ICD-10-CM

## 2022-11-07 DIAGNOSIS — O35.8XX0 SUSPECTED DAMAGE TO FETUS FROM DISEASE IN MOTHER, ANTEPARTUM CONDITION, SINGLE OR UNSPECIFIED FETUS: Primary | ICD-10-CM

## 2022-11-07 LAB
ABDOMINAL CIRCUMFERENCE: NORMAL
ABDOMINAL CIRCUMFERENCE: NORMAL
BIPARIETAL DIAMETER: NORMAL
BIPARIETAL DIAMETER: NORMAL
ESTIMATED FETAL WEIGHT: NORMAL
ESTIMATED FETAL WEIGHT: NORMAL
FEMORAL DIAMETER: NORMAL
FEMORAL DIAMETER: NORMAL
HC/AC: NORMAL
HC/AC: NORMAL
HEAD CIRCUMFERENCE: NORMAL
HEAD CIRCUMFERENCE: NORMAL

## 2022-11-07 PROCEDURE — 99243 OFF/OP CNSLTJ NEW/EST LOW 30: CPT | Performed by: OBSTETRICS & GYNECOLOGY

## 2022-11-07 PROCEDURE — 36415 COLL VENOUS BLD VENIPUNCTURE: CPT

## 2022-11-07 PROCEDURE — 76817 TRANSVAGINAL US OBSTETRIC: CPT | Performed by: OBSTETRICS & GYNECOLOGY

## 2022-11-07 PROCEDURE — 82105 ALPHA-FETOPROTEIN SERUM: CPT

## 2022-11-07 PROCEDURE — 76811 OB US DETAILED SNGL FETUS: CPT | Performed by: OBSTETRICS & GYNECOLOGY

## 2022-11-08 LAB
SEND OUT REPORT: NORMAL
TEST NAME: NORMAL

## 2022-11-10 LAB
AFP INTERPRETATION: NORMAL
AFP MOM: 0.73
AFP SPECIMEN: NORMAL
AFP: 33 NG/ML
DATE OF BIRTH: NORMAL
DATING METHOD: NORMAL
DETERMINED BY: NORMAL
DIABETIC: NEGATIVE
DONOR EGG?: NORMAL
DUE DATE: NORMAL
ESTIMATED DUE DATE: NORMAL
FAMILY HISTORY NTD: NEGATIVE
GESTATIONAL AGE: NORMAL
IN VITRO FERTILIZATION: NORMAL
INSULIN REQ DIABETES: NO
LAST MENSTRUAL PERIOD: NORMAL
MATERNAL AGE AT EDD: 27.9 YR
MATERNAL WEIGHT: 230
MONOCHORIONIC TWINS: NORMAL
NUMBER OF FETUSES: NORMAL
PATIENT WEIGHT UNITS: NORMAL
PATIENT WEIGHT: NORMAL
RACE (MATERNAL): NORMAL
RACE: NORMAL
REPEAT SPECIMEN?: NORMAL
SMOKING: NORMAL
SMOKING: NORMAL
VALPROIC/CARBAMAZEP: NORMAL
ZZ NTE CLEAN UP: HISTORY: NO

## 2022-11-11 ENCOUNTER — ROUTINE PRENATAL (OUTPATIENT)
Dept: OBGYN CLINIC | Age: 27
End: 2022-11-11
Payer: MEDICAID

## 2022-11-11 VITALS
WEIGHT: 234.25 LBS | SYSTOLIC BLOOD PRESSURE: 116 MMHG | BODY MASS INDEX: 42.84 KG/M2 | DIASTOLIC BLOOD PRESSURE: 72 MMHG

## 2022-11-11 DIAGNOSIS — O44.40 LOW-LYING PLACENTA: ICD-10-CM

## 2022-11-11 DIAGNOSIS — F31.9 BIPOLAR DISEASE DURING PREGNANCY, ANTEPARTUM (HCC): ICD-10-CM

## 2022-11-11 DIAGNOSIS — O09.90 HIGH RISK PREGNANCY, ANTEPARTUM: Primary | ICD-10-CM

## 2022-11-11 DIAGNOSIS — Z3A.20 20 WEEKS GESTATION OF PREGNANCY: ICD-10-CM

## 2022-11-11 DIAGNOSIS — O99.340 BIPOLAR DISEASE DURING PREGNANCY, ANTEPARTUM (HCC): ICD-10-CM

## 2022-11-11 DIAGNOSIS — O99.210 OBESITY AFFECTING PREGNANCY, ANTEPARTUM: ICD-10-CM

## 2022-11-11 PROCEDURE — 99213 OFFICE O/P EST LOW 20 MIN: CPT | Performed by: ADVANCED PRACTICE MIDWIFE

## 2022-11-11 NOTE — PROGRESS NOTES
SUBJECTIVE:    Carlos Alberto is here for her return OB visit. denies concerns today. She reports  feeling fetal movement. She denies vaginal bleeding. She denies vaginal discharge. She denies leaking of fluid. She denies uterine cramping. She denies  nausea and/or vomiting. OBJECTIVE:  Blood pressure 116/72, weight 234 lb 4 oz (106.3 kg), last menstrual period 06/19/2022, currently breastfeeding. Total weight gain: 12 lb 4 oz (5.557 kg)    Carlos Alberto accepted the flu vaccine as appropriate. ASSESSMENT/PLAN:  IUP @ 20+5 weeks  S =D    High Risk Pregnancy  Due date is based on LMP and confirmed with 7w3d early dating ultrasound  Patient's prenatal labs are completed. Patient's blood type O positive and rhogam is not indicated in pregnancy. Early glucola indicated: yes - not completed  Patient accepted genetic screening. NIPS normal  Anatomy scan completed. Placenta anterior,normal cord insertion: Yes, cervical length 3.87cm, yes - 17.7mm from os low lying, no previa noted. Follow up at 26 weeks. Glucola between 24-28 weeks. Reviewed and ordered  Total weight gain in pregnancy reviewed: Yes  Fetal movement reviewed     - Glucose tolerance, 1 hour  - CBC with Auto Differential; Future  - Urinalysis with Reflex to Culture; Future    2. 20 weeks gestation of pregnancy  - Reviewed anatomy scan and follow up recommendations. Reviewed MFM consult    3. Obesity affecting pregnancy, antepartum  - early 1 hour completed  - Reviewed baby asa daily    4. Bipolar disease during pregnancy, antepartum ([address]  - Stable on medications  - Fetal echo scheduled    5.  Low-lying placenta  - Has follow up scheduled reviewed with patient      She was counseled regarding all of the above:    Return in about 4 weeks (around 12/9/2022) for Return OB, with .    The patient, Helen Reid,  was seen with a total time spent of 25 minutes for the visit on this date of service by the [facility]  On this date November 11, 2022,  I have spent greater than 50% of this visit:  [x] reviewing previous notes  [x] reviewing test results  [x] pre-charting  Discussed with patient:  [x] Importance of compliance with treatment plan  [x] Counseling  [] Coordinating care  [x] Documenting     Electronically Signed By: Edwin Lewis

## 2022-11-29 ENCOUNTER — TELEPHONE (OUTPATIENT)
Dept: PERINATAL CARE | Age: 27
End: 2022-11-29

## 2022-11-29 DIAGNOSIS — O09.892 CYSTIC FIBROSIS CARRIER IN SECOND TRIMESTER, ANTEPARTUM: Primary | ICD-10-CM

## 2022-11-29 DIAGNOSIS — Z14.1 CYSTIC FIBROSIS CARRIER IN SECOND TRIMESTER, ANTEPARTUM: Primary | ICD-10-CM

## 2022-11-29 NOTE — TELEPHONE ENCOUNTER
Unable to reach patient regarding her carrier result. Left HIPAA compliant message to phone [facility] office.

## 2022-11-29 NOTE — TELEPHONE ENCOUNTER
Patient returned call and is aware of her maternal CF carrier result. She requests a lab review consultation and a kit for partner, Neerajkrysten Iliana DAVIS 1998, declines invasive testing for now.

## 2022-12-09 ENCOUNTER — ROUTINE PRENATAL (OUTPATIENT)
Dept: OBGYN CLINIC | Age: 27
End: 2022-12-09
Payer: MEDICAID

## 2022-12-09 ENCOUNTER — HOSPITAL ENCOUNTER (OUTPATIENT)
Age: 27
Setting detail: SPECIMEN
Discharge: HOME | End: 2022-12-09

## 2022-12-09 VITALS — DIASTOLIC BLOOD PRESSURE: 70 MMHG | BODY MASS INDEX: 43.35 KG/M2 | SYSTOLIC BLOOD PRESSURE: 118 MMHG | WEIGHT: 237 LBS

## 2022-12-09 DIAGNOSIS — Z14.1 CYSTIC FIBROSIS CARRIER IN SECOND TRIMESTER, ANTEPARTUM: ICD-10-CM

## 2022-12-09 DIAGNOSIS — O09.90 HIGH RISK PREGNANCY, ANTEPARTUM: Primary | ICD-10-CM

## 2022-12-09 DIAGNOSIS — O09.90 HIGH RISK PREGNANCY, ANTEPARTUM: ICD-10-CM

## 2022-12-09 DIAGNOSIS — Z3A.24 24 WEEKS GESTATION OF PREGNANCY: ICD-10-CM

## 2022-12-09 DIAGNOSIS — O99.210 OBESITY AFFECTING PREGNANCY, ANTEPARTUM: ICD-10-CM

## 2022-12-09 DIAGNOSIS — O09.892 CYSTIC FIBROSIS CARRIER IN SECOND TRIMESTER, ANTEPARTUM: ICD-10-CM

## 2022-12-09 LAB
ABSOLUTE EOS #: 0.04 K/UL (ref 0–0.44)
ABSOLUTE IMMATURE GRANULOCYTE: 0.2 K/UL (ref 0–0.3)
ABSOLUTE LYMPH #: 3.06 K/UL (ref 1.1–3.7)
ABSOLUTE MONO #: 0.66 K/UL (ref 0.1–1.2)
BASOPHILS # BLD: 0 % (ref 0–2)
BASOPHILS ABSOLUTE: 0.05 K/UL (ref 0–0.2)
BILIRUBIN URINE: NEGATIVE
COLOR: YELLOW
COMMENT UA: ABNORMAL
EOSINOPHILS RELATIVE PERCENT: 0 % (ref 1–4)
GLUCOSE ADMINISTRATION: NORMAL
GLUCOSE TOLERANCE SCREEN 50G: 97 MG/DL (ref 70–135)
GLUCOSE URINE: NEGATIVE
HCT VFR BLD CALC: 37.2 % (ref 36.3–47.1)
HEMOGLOBIN: 12 G/DL (ref 11.9–15.1)
IMMATURE GRANULOCYTES: 1 %
KETONES, URINE: ABNORMAL
LEUKOCYTE ESTERASE, URINE: NEGATIVE
LYMPHOCYTES # BLD: 22 % (ref 24–43)
MCH RBC QN AUTO: 31.5 PG (ref 25.2–33.5)
MCHC RBC AUTO-ENTMCNC: 32.3 G/DL (ref 28.4–34.8)
MCV RBC AUTO: 97.6 FL (ref 82.6–102.9)
MONOCYTES # BLD: 5 % (ref 3–12)
NITRITE, URINE: NEGATIVE
NRBC AUTOMATED: 0 PER 100 WBC
PDW BLD-RTO: 12.3 % (ref 11.8–14.4)
PH UA: 6.5 (ref 5–8)
PLATELET # BLD: 446 K/UL (ref 138–453)
PMV BLD AUTO: 9.8 FL (ref 8.1–13.5)
PROTEIN UA: NEGATIVE
RBC # BLD: 3.81 M/UL (ref 3.95–5.11)
SEG NEUTROPHILS: 72 % (ref 36–65)
SEGMENTED NEUTROPHILS ABSOLUTE COUNT: 9.92 K/UL (ref 1.5–8.1)
SPECIFIC GRAVITY UA: 1.01 (ref 1–1.03)
TURBIDITY: CLEAR
URINE HGB: NEGATIVE
UROBILINOGEN, URINE: NORMAL
WBC # BLD: 13.9 K/UL (ref 3.5–11.3)

## 2022-12-09 PROCEDURE — 99213 OFFICE O/P EST LOW 20 MIN: CPT | Performed by: OBSTETRICS & GYNECOLOGY

## 2022-12-09 NOTE — PROGRESS NOTES
Dylan Chamberlain is a 32 y.o. female 24w5d        OB History    Para Term  AB Living   1 0 0 0 0 0   SAB IAB Ectopic Molar Multiple Live Births   0 0 0 0 0 0      # Outcome Date GA Lbr Maciel/2nd Weight Sex Delivery Anes PTL Lv   1 Current                Vitals  BP: 118/70  Weight: 237 lb (107.5 kg)  Patient Position: Sitting  Albumin: Negative  Glucose: Negative    The patient was seen and evaluated. There was positive fetal movements. No contractions or leakage of fluid. Signs and symptoms of  labor as well as labor were reviewed. Normal NIPT. The patients anatomy ultrasound has been completed and reviewed with patient. TOP ST [place] Reviewed. A 28 week lab panel was ordered. This includes a (HH, 1 hr GTT, U/A C&S). The patient is to complete this in the next two to four weeks. The S/S of Pre-Eclampsia were reviewed with the patient in detail. She is to report any of these if they occur. She currently denies any of these. The patient is RH positive Rhogam Ordered no    The patient was instructed on fetal kick counts and was given a kick sheet to complete every 8 hours. This is to begin at 28 weeks gestation. She was instructed that the baby should move at a minimum of ten times within one hour after a meal. The patient was instructed to lay down on her left side twenty minutes after eating and count movements for up to one hour with a target value of ten movements. She was instructed to notify the office if she did not make that target after two attempts or if after any attempt there was less than four movements. Insurance [facility] [employer]        Plans to deliver: [address]    FOANGELES bowen    1st trimester screen/NIPs: NIPS collected 22.  NEG cfDNA/NIPS      AFP    Fetal Echo    High Risk:  Chews Tobacco- stopped when found out she was pregnant  Hx of Anxiety and Depression  - reluxti, buspar and zoloft for bipolar    Early 1 hr GTT: 22 Ordered     Covid Vaccine: J&J  booster with Pfizer    Flu Vaccine: received 2022     Tdap:    Rhogam: n/a    1hr GTT:    3hr GTT:    GBS:    2nd trimester appointment with Dr. Claus Blair :     3rd trimester appointment with Dr. Claus Blair :       Assessment:  1. Jaclyn Servin is a 32 y.o. female  2.   3. 24w5d    Patient Active Problem List    Diagnosis Date Noted    High risk pregnancy, antepartum 2022     Priority: Medium    Bipolar disease during pregnancy, antepartum ([facility] [address] 2022     Priority: Medium    Obesity affecting pregnancy, antepartum 2022     Priority: Medium    Depression 2021    Anxiety 2021    Dysmenorrhea         Diagnosis Orders   1. High risk pregnancy, antepartum        2. 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 24 weeks gestation of pregnancy              Plan:  The patient will return to the office for her next visit in 3 weeks. See antepartum flow sheet. Counseled on s/s of preeclampsia. Counseled on s/s of  labor. Follow up MFM for placental locations and fetal echocardiogram  1hr gtt done today        Patient was seen with total face to face time of 20 minutes. More than 50% of this visit was on counseling and education regarding her    Diagnosis Orders   1. High risk pregnancy, antepartum        2. 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 24 weeks gestation of pregnancy         and her options. She was also counseled on her preventative health maintenance recommendations and follow-up.

## 2022-12-19 ENCOUNTER — ROUTINE PRENATAL (OUTPATIENT)
Dept: PERINATAL CARE | Age: 27
End: 2022-12-19
Payer: MEDICAID

## 2022-12-19 VITALS
DIASTOLIC BLOOD PRESSURE: 73 MMHG | HEIGHT: 62 IN | SYSTOLIC BLOOD PRESSURE: 120 MMHG | BODY MASS INDEX: 44.34 KG/M2 | TEMPERATURE: 97.2 F | RESPIRATION RATE: 16 BRPM | WEIGHT: 240.96 LBS | HEART RATE: 108 BPM

## 2022-12-19 DIAGNOSIS — O99.212 OBESITY AFFECTING PREGNANCY IN SECOND TRIMESTER: ICD-10-CM

## 2022-12-19 DIAGNOSIS — O35.8XX0 SUSPECTED DAMAGE TO FETUS FROM DISEASE IN MOTHER, ANTEPARTUM CONDITION, SINGLE OR UNSPECIFIED FETUS: Primary | ICD-10-CM

## 2022-12-19 DIAGNOSIS — Z3A.26 26 WEEKS GESTATION OF PREGNANCY: ICD-10-CM

## 2022-12-19 DIAGNOSIS — O44.40 LOW IMPLANTATION OF PLACENTA WITHOUT HEMORRHAGE: ICD-10-CM

## 2022-12-19 DIAGNOSIS — Z03.72 SUSPECTED PLACENTAL PROBLEM NOT FOUND: ICD-10-CM

## 2022-12-19 DIAGNOSIS — Z36.4 ULTRASOUND FOR ANTENATAL SCREENING FOR FETAL GROWTH RESTRICTION: ICD-10-CM

## 2022-12-19 PROCEDURE — 76827 ECHO EXAM OF FETAL HEART: CPT | Performed by: OBSTETRICS & GYNECOLOGY

## 2022-12-19 PROCEDURE — 99999 PR OFFICE/OUTPT VISIT,PROCEDURE ONLY: CPT | Performed by: OBSTETRICS & GYNECOLOGY

## 2022-12-19 PROCEDURE — 93325 DOPPLER ECHO COLOR FLOW MAPG: CPT | Performed by: OBSTETRICS & GYNECOLOGY

## 2022-12-19 PROCEDURE — 76816 OB US FOLLOW-UP PER FETUS: CPT | Performed by: OBSTETRICS & GYNECOLOGY

## 2022-12-19 PROCEDURE — 76817 TRANSVAGINAL US OBSTETRIC: CPT | Performed by: OBSTETRICS & GYNECOLOGY

## 2022-12-19 PROCEDURE — 76825 ECHO EXAM OF FETAL HEART: CPT | Performed by: OBSTETRICS & GYNECOLOGY

## 2022-12-27 ENCOUNTER — TELEPHONE (OUTPATIENT)
Dept: OBGYN CLINIC | Age: 27
End: 2022-12-27

## 2022-12-27 NOTE — TELEPHONE ENCOUNTER
PT CALLED IN THIS AM WITH C/O OF ACHY TENDER UTERUS TO LOWER ABDOMEN AREA- IT DOES EXTEND INTO THE THIGHS- STATED IT COULD BE THE POSITION OF THE BABY- THE WEIGHT OF THE UTERUS- ROUND LIAGEMENT PAINS- DID IN COURAGE USE OF BELLY BAND TO SEE IF THAT WILL HELP AS WELL- DID INFORM PATIENT IF SHE HAS ANY VAGINAL BLEEDING- LEAKING OF FLUID DECREASE FETAL MOVEMENTS OR WORSEN SX TO PRESENT TO [facility] ER - I DID TRY AND GET A MESSAGE TO DR GARRETT- BUT WITH HIM BEING ON CALL DID NOT HEAR BACK    DID LEAVE A DETAILED MESSAGE OF THIS ON PATIENT VM- EXPLAINED PROVIDERS WILL BE BACK IN THE OFFICE TOMORROW- I WILL ROUTE THIS TO THEM- STATED AT ANY TIME IF SHE HAS CONCERNS OR QUESTIONS TO CALL THE ON CALL  WHICH HAPPENS TO BE DR GARRETT TODAY [phone]-

## 2022-12-28 NOTE — TELEPHONE ENCOUNTER
It could definitely be pregnancy related as you mentioned different causes we can check a urine/urine culture also though to make sure not a cause. If she is having vaginal discharge or odor recommend culture. Stay hydrated, if bleeding, decreased movement notify us.   If she would like to schedule appt to discuss sooner, schedule her with ne of providers

## 2022-12-29 ENCOUNTER — HOSPITAL ENCOUNTER (OUTPATIENT)
Age: 27
Discharge: HOME | End: 2022-12-29
Attending: OBSTETRICS & GYNECOLOGY | Admitting: OBSTETRICS & GYNECOLOGY
Payer: MEDICAID

## 2022-12-29 VITALS
RESPIRATION RATE: 16 BRPM | SYSTOLIC BLOOD PRESSURE: 118 MMHG | DIASTOLIC BLOOD PRESSURE: 72 MMHG | TEMPERATURE: 98.1 F | OXYGEN SATURATION: 98 % | HEART RATE: 78 BPM

## 2022-12-29 PROBLEM — O09.92 HRP (HIGH RISK PREGNANCY), SECOND TRIMESTER: Status: ACTIVE | Noted: 2022-12-29

## 2022-12-29 PROCEDURE — 99213 OFFICE O/P EST LOW 20 MIN: CPT

## 2022-12-29 RX ORDER — ACETAMINOPHEN 500 MG
1000 TABLET ORAL EVERY 6 HOURS PRN
Status: DISCONTINUED | OUTPATIENT
Start: 2022-12-29 | End: 2022-12-29 | Stop reason: HOSPADM

## 2022-12-29 NOTE — H&P
OBSTETRICAL HISTORY [facility]    Date: 2022       Time: 8:06 PM   Patient Name: Darian Barrera     Patient : 1995  Room/Bed: [facility]/[place] [address]    Admission Date/Time: 2022 11:50 AM      CC: decreased fetal movement     HPI: Darian Barrera is a 32 y.o. Sheilda Richer at 27w4d who presents to L&D triage complaining of decreased fetal movement. The patient reports that she hasn't tried techniques to encourage fetal movement or attempt kick counts. Patient states she has no felt FM from this morning. Patient denies any fever, chills, N/V, headaches, vision changes, chest pain, shortness of breath, RUQ pain, abdominal pain, and increased swelling/tenderness in bilateral lower extremities. Patient denies any vaginal discharge and any urinary complaints. The patient reports fetal movement is now present, denies contractions, denies loss of fluid, denies vaginal bleeding. DATING:  LMP: Patient's last menstrual period was 2022.   Estimated Date of Delivery: 3/26/23   Based on: LMP c/w early ultrasound at 7 weeks 3 days gestation    PREGNANCY RISK FACTORS:  Patient Active Problem List   Diagnosis    Depression    Anxiety    Dysmenorrhea    High risk pregnancy, antepartum    Bipolar disease during pregnancy, antepartum ([facility] [address]    Obesity affecting pregnancy, antepartum    HRP (high risk pregnancy), second trimester        Steroids Given In This Pregnancy:  no     REVIEW OF SYSTEMS:  Constitutional: negative fever, chills  HEENT: negative headaches, visual disturbances  Respiratory: negative dyspnea, cough, wheezing  Cardiovascular: negative chest pain, palpitations  Gastrointestinal: negative abdominal pain, RUQ pain, N/V, diarrhea, constipation  Genitourinary: negative dysuria, frequency, hesitancy, hematuria, changes in vaginal discharge  Dermatological: negative rash  Hematologic: negative bruising  Immunologic/Lymphatic: negative recent illness, recent sick contact, LE swelling   Musculoskeletal: negative back pain, myalgias, arthralgias  Neurological: negative dizziness, weakness, loss of sensation, tingling  Behavior/Psych: negative depression, anxiety  Obstetrics: positive fetal movement, negative LOF, negative vaginal bleeding, negative contractions, negative pelvic cramping      OBSTETRICAL HISTORY:   OB History    Para Term  AB Living   1 0 0 0 0 0   SAB IAB Ectopic Molar Multiple Live Births   0 0 0 0 0 0      # Outcome Date GA Lbr Maciel/2nd Weight Sex Delivery Anes PTL Lv   1 Current                PAST MEDICAL HISTORY:   has a past medical history of Acid reflux, Anemia, Bipolar 1 disorder ([facility] [address] Depression, Dysmenorrhea, Irritable bowel syndrome, and Overactive bladder. PAST SURGICAL HISTORY:   has a past surgical history that includes Colposcopy. ALLERGIES:  is allergic to sulfamethoxazole-trimethoprim. MEDICATIONS:  Prior to Admission medications    Medication Sig Start Date End Date Taking? Authorizing Provider   Prenatal Vit-Fe Fumarate-FA (PRENATAL VITAMINS PO) Take 1 tablet by mouth daily    Historical Provider, MD   doxyLAMINE succinate (GNP SLEEP AID) 25 MG tablet Take 1 tablet by mouth nightly 22   HUGO Thomas CNM   REXULTI 0.5 MG TABS tablet  22   Historical Provider, MD   busPIRone (BUSPAR) 10 MG tablet Take 20 mg by mouth daily    Historical Provider, MD   sertraline (ZOLOFT) 100 MG tablet Take 75 mg by mouth daily    Historical Provider, MD       FAMILY HISTORY:  family history includes Arthritis in her mother; Cancer in her paternal grandfather; Diabetes in her father and maternal grandmother. SOCIAL HISTORY:   reports that she has never smoked. She has quit using smokeless tobacco.  Her smokeless tobacco use included chew. She reports that she does not currently use alcohol. She reports that she does not use drugs.     VITALS:  Vitals:    22 1201 22 1203   BP: 130/81 118/72   Pulse: 84 78   Resp: 16 16   Temp: 98.1 °F (36.7 °C)    TempSrc: Oral    SpO2: 98%          PHYSICAL EXAM:  Fetal Heart Monitor:  Baseline Heart Rate 145, moderate variability, present accelerations 10x10, absent decelerations  Shorter: Sravanthi none     General appearance:  awake, alert, cooperative, no apparent distress, and appears stated age  [de-identified]: head atraumatic, normocephalic, moist mucous membranes, trachea midline  Neurologic:  alert, oriented, normal speech, no focal findings or movement disorder noted  Lungs:  No increased work of breathing, good air exchange, clear to auscultation bilaterally, no crackles or wheezing  Heart:  Normal apical impulse, regular rate and rhythm, normal S1 and S2, no S3 or S4, and no murmur noted    Abdomen:  gravid, non-tender, and no rebound, guarding, or rigidity  Extremities:  no calf tenderness, non edematous  Musculoskeletal: Gross strength equal and intact throughout, no gross abnormalities, no CVA tenderness  Psychiatric: Mood appropriate, normal affect   Rectal Exam: not indicated  Pelvic Exam: not indicated       LIMITED BEDSIDE US:  Fetal Heart Tones: Present  Fetal Movement: Present   Fetal Tone: Present   Fetal Respirations: Present  Amniotic Fluid Index/Volume: adequate 2x2 cm MVP  BPP: 8/8      PRENATAL LAB RESULTS:   Blood Type/Rh: O pos  Antibody Screen: negative  Hemoglobin, Hematocrit, Platelets: 01.2 / 07.4 / 399  Rubella: immune  T.  Pallidum, IgG: non-reactive   Hepatitis B Surface Antigen: non-reactive   Hepatitis C Antibody: non-reactive  HIV: non-reactive   Sickle Cell Screen: negative  Gonorrhea: negative  Chlamydia: negative  Urine culture: negative, 8/11/22    1 hour Glucose Tolerance Test: 97      Group B Strep: not done  Cystic Fibrosis Screen: positive carrier   First Trimester Screen: not available  MSAFP: normal  Non-Invasive Prenatal Testing: no aneuploidy detected  Anatomy US: Anterior, low-lying placenta, 3 vc with normal insertion, normal female anatomy        ASSESSMENT & PLAN:  Kj Tabor is a 32 y.o. female  at 27w4d IUP    - Rh positive / R immune / GBS unknown   - No indication for GBS prophylaxis    Decreased fetal movement   - Patient reports FM has improved   - VSS, afebrile    - cEFM appropriate for gestational age, [facility] showing no contractions    - BPP     - Patient reports fetal movement has improved at this time    - Patient is stable for discharge. Patient was given  labor precautions and instructed to call her Ob office or return to L&D Triage if she experiences contractions that increase in intensity or become closer together, if she experiences vaginal bleeding, leakage of fluids, or decreased fetal movements. She states she understands. For all patients over 28 weeks gestation, Kick sheet parameters every 8 hours were reviewed and recommended. Bipolar disorder with Depression/Anxiety    - Currently controlled on Rexulti 0.5mg qd, Buspar 20mg qd, and Zoloft 75mg qd   - Denies SI/HI, reports good control of symptoms     Fetal exposure to SSRI   - Anatomy US wnl  - Fetal echo showed no evidence of cardiac defects 22    THC use    - Positive UDS on initial prenatal labs   - Encouraged cessation     BMI 44    Patient Active Problem List    Diagnosis Date Noted    HRP (high risk pregnancy), second trimester 2022     Priority: Medium    High risk pregnancy, antepartum 2022     Priority: Medium    Bipolar disease during pregnancy, antepartum ([facility] [address] 2022     Priority: Medium    Obesity affecting pregnancy, antepartum 2022     Priority: Medium    Depression 2021    Anxiety 2021    Dysmenorrhea        Plan discussed with Dr. Biju Aponte, who is agreeable. Steroids given this admission: No    Risks, benefits, alternatives and possible complications have been discussed in detail with the patient. Admission, and post admission procedures and expectations were discussed in detail.  All questions were answered.     Attending's Name: Dr. Misty Lenz MD  Ob/Gyn Resident  [address] [facility]  12/29/2022, 8:06 PM

## 2022-12-29 NOTE — TELEPHONE ENCOUNTER
Pt called back in and stated it has now been 36 hours and has not felt the baby move- she is a  and had her dr their us their ultrasound and could see the baby moving    After speaking with agueda given 27 weeks and as precautionary measure and safety of the baby to go to  v for evaluation- to make sure nothing is wrong and to have an official ultrasound read-     Pt was made aware of this and stated she understood.

## 2023-01-06 ENCOUNTER — TELEMEDICINE (OUTPATIENT)
Dept: OBGYN CLINIC | Age: 28
End: 2023-01-06

## 2023-01-06 DIAGNOSIS — O09.90 HIGH RISK PREGNANCY, ANTEPARTUM: Primary | ICD-10-CM

## 2023-01-06 DIAGNOSIS — Z3A.28 28 WEEKS GESTATION OF PREGNANCY: ICD-10-CM

## 2023-01-06 DIAGNOSIS — F31.9 BIPOLAR DISEASE DURING PREGNANCY, ANTEPARTUM (HCC): ICD-10-CM

## 2023-01-06 DIAGNOSIS — O99.210 OBESITY AFFECTING PREGNANCY, ANTEPARTUM: ICD-10-CM

## 2023-01-06 DIAGNOSIS — O99.340 BIPOLAR DISEASE DURING PREGNANCY, ANTEPARTUM (HCC): ICD-10-CM

## 2023-01-06 NOTE — PROGRESS NOTES
Miguelito Shell (:  1995) is a Established patient, here for evaluation of the following:    Assessment & Plan   Below is the assessment and plan developed based on review of pertinent history, physical exam, labs, studies, and medications. 1. High risk pregnancy, antepartum  Due date is based on LMP and confirmed with 7w3d early dating ultrasound  Patient's prenatal labs are completed. Patient's blood type O positive and rhogam is not indicated in pregnancy. Early glucola indicated: yes - not completed  Patient accepted genetic screening. NIPS normal  Anatomy scan completed. Placenta anterior,normal cord insertion: Yes, cervical length 3.87cm, yes - 17.7mm from os low lying, no previa noted. Follow up at 26 weeks. 22 Fetal echo WNL EFW 57% NO LOW LYING placentation, f/up as clinically indicated  2. 28 weeks gestation of pregnancy  - reviewed limitation of virtual visit of not being able to evaluate fetal heart tone and maternal vitals patient verbalized understanding  3. Obesity affecting pregnancy, antepartum  - early 1 hour completed  - Reviewed baby asa daily  4. Bipolar disease during pregnancy, antepartum ([address]  - Stable on medications  - Fetal echo scheduled  Return in about 2 weeks (around 2023) for Return OB. Subjective   Patient request virtual visit due to living an hour away. Reports positive fetal movement  Denies any vaginal bleeding, cramping, contractions, abnormal vaginal discharge or any urinary symptoms.  Reports no chest pain, shortness of breath, headache, nausea and/or vomiting     Review of Systems       Objective   Patient-Reported Vitals  No data recorded     Physical Exam  [INSTRUCTIONS:  \"[x]\" Indicates a positive item  \"[]\" Indicates a negative item  -- DELETE ALL ITEMS NOT EXAMINED]    Constitutional: [x] Appears well-developed and well-nourished [x] No apparent distress      [] Abnormal -     Mental status: [x] Alert and awake  [x] Oriented to person/place/time [x] Able to follow commands    [] Abnormal -     Eyes:   EOM    [x]  Normal    [] Abnormal -   Sclera  [x]  Normal    [] Abnormal -          Discharge [x]  None visible   [] Abnormal -     HENT: [x] Normocephalic, atraumatic  [] Abnormal -   [x] Mouth/Throat: Mucous membranes are moist    External Ears [x] Normal  [] Abnormal -    Neck: [x] No visualized mass [] Abnormal -     Pulmonary/Chest: [x] Respiratory effort normal   [x] No visualized signs of difficulty breathing or respiratory distress        [] Abnormal -      Musculoskeletal:   [x] Normal gait with no signs of ataxia         [x] Normal range of motion of neck        [] Abnormal -     Neurological:        [x] No Facial Asymmetry (Cranial nerve 7 motor function) (limited exam due to video visit)          [x] No gaze palsy        [] Abnormal -          Skin:        [x] No significant exanthematous lesions or discoloration noted on facial skin         [] Abnormal -            Psychiatric:       [x] Normal Affect [] Abnormal -        [x] No Hallucinations    Other pertinent observable physical exam findings:-         On this date 1/6/2023 I have spent 25 minutes reviewing previous notes, test results and face to face (virtual) with the patient discussing the diagnosis and importance of compliance with the treatment plan as well as documenting on the day of the visitJessica Carl, was evaluated through a synchronous (real-time) audio-video encounter. The patient (or guardian if applicable) is aware that this is a billable service, which includes applicable co-pays. This Virtual Visit was conducted with patient's (and/or legal guardian's) consent. The visit was conducted pursuant to the emergency declaration under the [address] [place] [address], [address] [place] [address] authority and the [employer] Act. Patient identification was verified, and a caregiver was present when appropriate.    The patient was located at Other: work . Provider was located at [facility] ([place] [address]): [address] [place] [address]  HostShanice Harper [facility]..         --HUGO Hawk CNM

## 2023-01-17 ENCOUNTER — ROUTINE PRENATAL (OUTPATIENT)
Dept: PERINATAL CARE | Age: 28
End: 2023-01-17
Payer: MEDICAID

## 2023-01-17 VITALS
RESPIRATION RATE: 16 BRPM | HEIGHT: 62 IN | SYSTOLIC BLOOD PRESSURE: 112 MMHG | WEIGHT: 242 LBS | HEART RATE: 102 BPM | TEMPERATURE: 97.7 F | DIASTOLIC BLOOD PRESSURE: 74 MMHG | BODY MASS INDEX: 44.53 KG/M2

## 2023-01-17 DIAGNOSIS — O09.893 CYSTIC FIBROSIS CARRIER IN THIRD TRIMESTER, ANTEPARTUM: Primary | ICD-10-CM

## 2023-01-17 DIAGNOSIS — Z14.1 CYSTIC FIBROSIS CARRIER IN THIRD TRIMESTER, ANTEPARTUM: Primary | ICD-10-CM

## 2023-01-17 DIAGNOSIS — Z3A.30 30 WEEKS GESTATION OF PREGNANCY: ICD-10-CM

## 2023-01-17 PROCEDURE — 99244 OFF/OP CNSLTJ NEW/EST MOD 40: CPT | Performed by: OBSTETRICS & GYNECOLOGY

## 2023-01-17 RX ORDER — SERTRALINE HYDROCHLORIDE 25 MG/1
TABLET, FILM COATED ORAL
COMMUNITY
Start: 2022-12-10

## 2023-01-17 RX ORDER — TRAZODONE HYDROCHLORIDE 50 MG/1
TABLET ORAL
COMMUNITY
Start: 2022-12-10

## 2023-01-30 ENCOUNTER — ROUTINE PRENATAL (OUTPATIENT)
Dept: OBGYN CLINIC | Age: 28
End: 2023-01-30

## 2023-01-30 VITALS — DIASTOLIC BLOOD PRESSURE: 74 MMHG | WEIGHT: 248 LBS | BODY MASS INDEX: 45.36 KG/M2 | SYSTOLIC BLOOD PRESSURE: 128 MMHG

## 2023-01-30 DIAGNOSIS — O99.210 OBESITY AFFECTING PREGNANCY, ANTEPARTUM: ICD-10-CM

## 2023-01-30 DIAGNOSIS — Z3A.32 32 WEEKS GESTATION OF PREGNANCY: ICD-10-CM

## 2023-01-30 DIAGNOSIS — O09.90 HIGH RISK PREGNANCY, ANTEPARTUM: Primary | ICD-10-CM

## 2023-02-02 NOTE — PROGRESS NOTES
Evert Esteves is a 32 y.o. female 32w3d        OB History    Para Term  AB Living   1 0 0 0 0 0   SAB IAB Ectopic Molar Multiple Live Births   0 0 0 0 0 0      # Outcome Date GA Lbr Maciel/2nd Weight Sex Delivery Anes PTL Lv   1 Current                Vitals  BP: 128/74  Weight: 248 lb (112.5 kg)  Patient Position: Sitting      The patient was seen and evaluated. There was positive fetal movements. No contractions or leakage of fluid. Signs and symptoms of  labor as well as labor were reviewed. The S/S of Pre-Eclampsia were reviewed with the patient in detail. She is to report any of these if they occur. She currently denies any of these. The patient had her 28 week labs ordered. No visits with results within 5 Week(s) from this visit. Latest known visit with results is:   Hospital Outpatient Visit on 2022   Component Date Value Ref Range Status    Color, UA 2022 Yellow  Yellow Final    Turbidity UA 2022 Clear  Clear Final    Glucose, Ur 2022 NEGATIVE  NEGATIVE Final    Bilirubin Urine 2022 NEGATIVE  NEGATIVE Final    Ketones, Urine 2022 MODERATE (A)  NEGATIVE Final    Specific [place], UA 2022 1.013  1.005 - 1.030 Final    Urine Hgb 2022 NEGATIVE  NEGATIVE Final    pH, UA 2022 6.5  5.0 - 8.0 Final    Protein, UA 2022 NEGATIVE  NEGATIVE Final    Urobilinogen, Urine 2022 Normal  Normal Final    Nitrite, Urine 2022 NEGATIVE  NEGATIVE Final    Leukocyte Esterase, Urine 2022 NEGATIVE  NEGATIVE Final    Urinalysis Comments 2022 Microscopic exam not performed based on chemical results unless requested in original order.    Final    WBC 2022 13.9 (A)  3.5 - 11.3 k/uL Final    RBC 2022 3.81 (A)  3.95 - 5.11 m/uL Final    Hemoglobin 2022 12.0  11.9 - 15.1 g/dL Final    Hematocrit 2022 37.2  36.3 - 47.1 % Final    MCV 2022 97.6  82.6 - 102.9 fL Final   [facility] ([facility]) 2022 31.5  25.2 - 33.5 pg Final    MCHC 2022 32.3  28.4 - 34.8 g/dL Final    RDW 2022 12.3  11.8 - 14.4 % Final    Platelets  446  138 - 453 k/uL Final    MPV 2022 9.8  8.1 - 13.5 fL Final    NRBC Automated 2022 0.0  0.0 per 100 WBC Final    Seg Neutrophils 2022 72 (A)  36 - 65 % Final    Lymphocytes 2022 22 (A)  24 - 43 % Final    Monocytes 2022 5  3 - 12 % Final    Eosinophils % 2022 0 (A)  1 - 4 % Final    Basophils 2022 0  0 - 2 % Final    Immature Granulocytes 2022 1 (A)  0 % Final    Segs Absolute 2022 9.92 (A)  1.50 - 8.10 k/uL Final    Absolute Lymph # 2022 3.06  1.10 - 3.70 k/uL Final    Absolute Mono # 2022 0.66  0.10 - 1.20 k/uL Final    Absolute Eos # 2022 0.04  0.00 - 0.44 k/uL Final    Basophils Absolute 2022 0.05  0.00 - 0.20 k/uL Final    Absolute Immature Granulocyte 2022 0.20  0.00 - 0.30 k/uL Final    GLU ADMN 2022 Glucola   Final    Glucose tolerance screen 50g 2022 97  70 - 135 mg/dL Final   ]    Insurance [facility] Plus        Plans to deliver: [address]    1st trimester screen/NIPs: NIPS collected 22. NEG cfDNA/NIPS      AFP    Fetal Echo    High Risk:  Chews Tobacco- stopped when found out she was pregnant  Hx of Anxiety and Depression  - reluxti, buspar and zoloft for bipolar    Early 1 hr GTT: 22 Ordered     Covid Vaccine: J&J  booster with Pfizer    Flu Vaccine: received 2022     Tdap:    Rhogam: n/a    1hr GTT:97    3hr GTT:    GBS:    2nd trimester appointment with Dr. Imelda Galdamez :     3rd trimester appointment with Dr. Imelda Galdamez :       T-Dap Vaccine (27-36 weeks): awaiting    The patient was instructed on fetal kick counts and was given a kick sheet to complete every 8 hours.  She was instructed that the baby should move at a minimum of ten times within one hour after a meal. The patient was instructed to lay down on her left side twenty minutes after eating and count movements for up to one hour with a target value of ten movements. She was instructed to notify the office if she did not make that target after two attempts or if after any attempt there was less than four movements. The patient reports that the targets have been made Yes.  Testing:  None                              Assessment:  1. Racheal Galdamez is a 32 y.o. female  2.   3. 32w3d    Patient Active Problem List    Diagnosis Date Noted    HRP (high risk pregnancy), second trimester 2022     Priority: Medium    High risk pregnancy, antepartum 2022     Priority: Medium    Bipolar disease during pregnancy, antepartum ([facility] [address] 2022     Priority: Medium    Obesity affecting pregnancy, antepartum 2022     Priority: Medium    Depression 2021    Anxiety 2021    Dysmenorrhea         Diagnosis Orders   1. High risk pregnancy, antepartum        2. 32 weeks gestation of pregnancy        3. Obesity affecting pregnancy, antepartum                  Plan:  The patient will return to the office for her next visit in 2 weeks. See antepartum flow sheet. Counseled on s/s of preeclampsia. Counseled on s/s of  labor. Counseled on fetal movement     Testing Indicated: No  Scheduled with Nursing-Pt notified: No      Patient was seen with total face to face time of 20 minutes. More than 50% of this visit was on counseling and education regarding her    Diagnosis Orders   1. High risk pregnancy, antepartum        2. 32 weeks gestation of pregnancy        3. Obesity affecting pregnancy, antepartum         and her options. She was also counseled on her preventative health maintenance recommendations and follow-up.

## 2023-02-03 ENCOUNTER — TELEPHONE (OUTPATIENT)
Dept: OBGYN CLINIC | Age: 28
End: 2023-02-03

## 2023-02-03 DIAGNOSIS — O21.9 NAUSEA AND VOMITING IN PREGNANCY: Primary | ICD-10-CM

## 2023-02-03 RX ORDER — ONDANSETRON 4 MG/1
4 TABLET, FILM COATED ORAL 3 TIMES DAILY PRN
Qty: 15 TABLET | Refills: 0 | Status: SHIPPED | OUTPATIENT
Start: 2023-02-03

## 2023-02-03 NOTE — TELEPHONE ENCOUNTER
Pt called in with complaints of feeling nauseous over the past couple days. Pt is requesting zofran be sent to pharmacy.

## 2023-02-03 NOTE — TELEPHONE ENCOUNTER
Is this new she is 32 weeks.  New onset nausea and vomiting in the third trimester I routinely get labs

## 2023-02-06 ENCOUNTER — TELEPHONE (OUTPATIENT)
Dept: PERINATAL CARE | Age: 28
End: 2023-02-06

## 2023-02-06 NOTE — TELEPHONE ENCOUNTER
Patient is aware that partner CF result is negative. Implications previously addressed 1/17/2023 and patient declines a repeat consult for her negative partner result.

## 2023-02-07 ENCOUNTER — TELEMEDICINE (OUTPATIENT)
Dept: OBGYN CLINIC | Age: 28
End: 2023-02-07
Payer: MEDICAID

## 2023-02-07 ENCOUNTER — TELEPHONE (OUTPATIENT)
Dept: OBGYN CLINIC | Age: 28
End: 2023-02-07

## 2023-02-07 DIAGNOSIS — F17.200 VAPING NICOTINE DEPENDENCE, NON-TOBACCO PRODUCT: Primary | ICD-10-CM

## 2023-02-07 PROCEDURE — 99213 OFFICE O/P EST LOW 20 MIN: CPT | Performed by: ADVANCED PRACTICE MIDWIFE

## 2023-02-07 ASSESSMENT — ENCOUNTER SYMPTOMS
VOMITING: 0
DIARRHEA: 0
SHORTNESS OF BREATH: 0
ABDOMINAL PAIN: 0
NAUSEA: 0

## 2023-02-07 ASSESSMENT — ANXIETY QUESTIONNAIRES
1. FEELING NERVOUS, ANXIOUS, OR ON EDGE: 3
2. NOT BEING ABLE TO STOP OR CONTROL WORRYING: 3
7. FEELING AFRAID AS IF SOMETHING AWFUL MIGHT HAPPEN: 3
3. WORRYING TOO MUCH ABOUT DIFFERENT THINGS: 3
GAD7 TOTAL SCORE: 18
IF YOU CHECKED OFF ANY PROBLEMS ON THIS QUESTIONNAIRE, HOW DIFFICULT HAVE THESE PROBLEMS MADE IT FOR YOU TO DO YOUR WORK, TAKE CARE OF THINGS AT HOME, OR GET ALONG WITH OTHER PEOPLE: VERY DIFFICULT
4. TROUBLE RELAXING: 3
5. BEING SO RESTLESS THAT IT IS HARD TO SIT STILL: 0
6. BECOMING EASILY ANNOYED OR IRRITABLE: 3

## 2023-02-07 ASSESSMENT — PATIENT HEALTH QUESTIONNAIRE - PHQ9
SUM OF ALL RESPONSES TO PHQ QUESTIONS 1-9: 14
SUM OF ALL RESPONSES TO PHQ QUESTIONS 1-9: 14
1. LITTLE INTEREST OR PLEASURE IN DOING THINGS: 3
8. MOVING OR SPEAKING SO SLOWLY THAT OTHER PEOPLE COULD HAVE NOTICED. OR THE OPPOSITE, BEING SO FIGETY OR RESTLESS THAT YOU HAVE BEEN MOVING AROUND A LOT MORE THAN USUAL: 0
3. TROUBLE FALLING OR STAYING ASLEEP: 3
SUM OF ALL RESPONSES TO PHQ QUESTIONS 1-9: 14
9. THOUGHTS THAT YOU WOULD BE BETTER OFF DEAD, OR OF HURTING YOURSELF: 0
6. FEELING BAD ABOUT YOURSELF - OR THAT YOU ARE A FAILURE OR HAVE LET YOURSELF OR YOUR FAMILY DOWN: 3
SUM OF ALL RESPONSES TO PHQ9 QUESTIONS 1 & 2: 5
5. POOR APPETITE OR OVEREATING: 0
4. FEELING TIRED OR HAVING LITTLE ENERGY: 3
10. IF YOU CHECKED OFF ANY PROBLEMS, HOW DIFFICULT HAVE THESE PROBLEMS MADE IT FOR YOU TO DO YOUR WORK, TAKE CARE OF THINGS AT HOME, OR GET ALONG WITH OTHER PEOPLE: 1
SUM OF ALL RESPONSES TO PHQ QUESTIONS 1-9: 14
2. FEELING DOWN, DEPRESSED OR HOPELESS: 2
7. TROUBLE CONCENTRATING ON THINGS, SUCH AS READING THE NEWSPAPER OR WATCHING TELEVISION: 0

## 2023-02-07 NOTE — PROGRESS NOTES
Theresa Smalls (:  1995) is a Established patient, here for evaluation of the following:    Assessment & Plan   Below is the assessment and plan developed based on review of pertinent history, physical exam, labs, studies, and medications. 1. Vaping nicotine dependence, non-tobacco product  - Had a long conversation with patient about mental health and pregnancy. Reviewed increased risk of postpartum depression. Emotional support given. Discussed dependence issue with nicotine. Ensured patients privacy that we will not discuss the nicotine use in front of her partner. Reviewed the risks and nicotine in pregnancy and in the  period, cessation encouraged. Encouraged patient to keep appointment with therapist to discuss any potential medication adjustments and therapy. Reviewed therapy can help patient with coping mechanisms and help get her a plan to stop with vaping as patient verbalizes she wants to stop but 'just cant'. - Reviewed any suicidal or homicidal ideation to get immediate help, call 911 and/or present to ER  - Reviewed and reinforced fetal kick counts,  labor, and preeclampsia. Return for keep scheduled OB appointment . Subjective   Patient requesting virtual visit to discuss mental health. Reports when she found out she was pregnant she was chewing and immediately stopped. Reports stress of life and pregnancy have increased and around 21-23 weeks starting vaping with nicotine. Reports this is causing her a lot of stress because her parnter does not know she is using vaping and does not want him to know. Is worried about fetal dependence on nicotine and being reprimanded in hospital and telling partner. Reports had a mental health assessment last week and meets with new therapist next week and it is mandatory to be in therapy in this office. Reports this does not need like a bipolar episode.  Report no self harm ideation or harm of others    In the past 7 days:  I have been able to laugh and see the funny side of things: Not at all  I have looked forward with enjoyment to things: Definitely less than I used to  I have blamed myself unnecessarily when things went wrong: Yes, most of the time  I have been anxious or worried for no good reason: Yes, very often  I have felt scared or panicky for no good reason: Yes, quite a lot  I haven't been able to cope lately: Yes, most of the time I haven't been able to cope  I have been so unhappy that I have had difficulty sleeping: Yes, most of the time  I have felt sad or miserable: Not very often  I have been so unhappy that I have been crying: Yes, most of the time  The thought of harming myself has occurred to me: Never  Total: 24    C-SSRS Suicide Screening 2/7/2023   1) Within the past month, have you wished you were dead or wished you could go to sleep and not wake up?  No   2) Have you actually had any thoughts of killing yourself?  No   6) Have you ever done anything, started to do anything, or prepared to do anything to end your life? No         Review of Systems   Constitutional:  Negative for unexpected weight change.   Respiratory:  Negative for shortness of breath.    Cardiovascular:  Negative for chest pain, palpitations and leg swelling.   Gastrointestinal:  Negative for abdominal pain, diarrhea, nausea and vomiting.   Genitourinary:  Negative for difficulty urinating, dyspareunia, menstrual problem, vaginal discharge and vaginal pain.   Neurological:  Negative for dizziness, light-headedness and headaches.   Psychiatric/Behavioral:  Positive for agitation. Negative for hallucinations, self-injury and suicidal ideas. The patient is nervous/anxious.         Objective   Patient-Reported Vitals  No data recorded     Physical Exam  [INSTRUCTIONS:  \"[x]\" Indicates a positive item  \"[]\" Indicates a negative item  -- DELETE ALL ITEMS NOT EXAMINED]    Constitutional: [x] Appears well-developed and well-nourished [x] No apparent distress   [] Abnormal -     Mental status: [x] Alert and awake  [x] Oriented to person/place/time [x] Able to follow commands    [] Abnormal -     Eyes:   EOM    [x]  Normal    [] Abnormal -   Sclera  [x]  Normal    [] Abnormal -          Discharge [x]  None visible   [] Abnormal -     HENT: [x] Normocephalic, atraumatic  [] Abnormal -   [x] Mouth/Throat: Mucous membranes are moist    External Ears [x] Normal  [] Abnormal -    Neck: [x] No visualized mass [] Abnormal -     Pulmonary/Chest: [x] Respiratory effort normal   [x] No visualized signs of difficulty breathing or respiratory distress        [] Abnormal -      Musculoskeletal:   [x] Normal gait with no signs of ataxia         [x] Normal range of motion of neck        [] Abnormal -     Neurological:        [x] No Facial Asymmetry (Cranial nerve 7 motor function) (limited exam due to video visit)          [x] No gaze palsy        [] Abnormal -          Skin:        [x] No significant exanthematous lesions or discoloration noted on facial skin         [] Abnormal -            Psychiatric:       [x] Normal Affect [] Abnormal -        [x] No Hallucinations    Other pertinent observable physical exam findings:-         On this date 2/7/2023 I have spent 25 minutes reviewing previous notes, test results and face to face (virtual) with the patient discussing the diagnosis and importance of compliance with the treatment plan as well as documenting on the day of the visitJessica Moreno, was evaluated through a synchronous (real-time) audio-video encounter. The patient (or guardian if applicable) is aware that this is a billable service, which includes applicable co-pays. This Virtual Visit was conducted with patient's (and/or legal guardian's) consent. The visit was conducted pursuant to the emergency declaration under the [facility], [address] [place] [address] authority and the [employer] General Act. Patient identification was verified, and a caregiver was present when appropriate.    The patient was located at Home: [address] [facility] [address]  Provider was located at [facility] ([address] [place], [place] [address]t): [address] [place] [address]  [address] [facility] christiano Guerra,  [address] [place] [address], [address] [place] [address]

## 2023-02-07 NOTE — TELEPHONE ENCOUNTER
Pt called to schedule an appt with [facility]. Pt did not want to say at first why she needed an appt. I talked with [facility], and she recommended an in person appt due to the Pt being pregnant. The Pt refused an in person appt. She said she would be willing to do a VV. Asked Pt for more information what she was being seen for. She said she used chewing tobacco before she was pregnant. She said at 23 weeks of pregnancy she starting vaping because she is stressed out. She wants to talk to [facility] about the [address] [place] [address].

## 2023-02-16 ENCOUNTER — HOSPITAL ENCOUNTER (OUTPATIENT)
Age: 28
Setting detail: SPECIMEN
Discharge: HOME | End: 2023-02-16

## 2023-02-16 ENCOUNTER — ROUTINE PRENATAL (OUTPATIENT)
Dept: OBGYN CLINIC | Age: 28
End: 2023-02-16
Payer: MEDICAID

## 2023-02-16 VITALS — BODY MASS INDEX: 46.64 KG/M2 | DIASTOLIC BLOOD PRESSURE: 78 MMHG | WEIGHT: 255 LBS | SYSTOLIC BLOOD PRESSURE: 118 MMHG

## 2023-02-16 DIAGNOSIS — R10.9 CRAMPING AFFECTING PREGNANCY, ANTEPARTUM: ICD-10-CM

## 2023-02-16 DIAGNOSIS — O26.899 CRAMPING AFFECTING PREGNANCY, ANTEPARTUM: ICD-10-CM

## 2023-02-16 DIAGNOSIS — O99.210 OBESITY AFFECTING PREGNANCY, ANTEPARTUM: ICD-10-CM

## 2023-02-16 DIAGNOSIS — Z3A.34 34 WEEKS GESTATION OF PREGNANCY: ICD-10-CM

## 2023-02-16 DIAGNOSIS — F31.9 BIPOLAR DISEASE DURING PREGNANCY, ANTEPARTUM (HCC): ICD-10-CM

## 2023-02-16 DIAGNOSIS — Z23 NEED FOR DIPHTHERIA-TETANUS-PERTUSSIS (TDAP) VACCINE: ICD-10-CM

## 2023-02-16 DIAGNOSIS — O99.340 BIPOLAR DISEASE DURING PREGNANCY, ANTEPARTUM (HCC): ICD-10-CM

## 2023-02-16 DIAGNOSIS — O09.90 HIGH RISK PREGNANCY, ANTEPARTUM: Primary | ICD-10-CM

## 2023-02-16 PROCEDURE — 90471 IMMUNIZATION ADMIN: CPT | Performed by: ADVANCED PRACTICE MIDWIFE

## 2023-02-16 PROCEDURE — 99213 OFFICE O/P EST LOW 20 MIN: CPT | Performed by: ADVANCED PRACTICE MIDWIFE

## 2023-02-16 PROCEDURE — 90715 TDAP VACCINE 7 YRS/> IM: CPT | Performed by: ADVANCED PRACTICE MIDWIFE

## 2023-02-16 NOTE — PROGRESS NOTES
SUBJECTIVE:  Chaperone for Intimate Exam  Chaperone was offered as part of the rooming process. Patient declined and agrees to continue with exam without a chaperone. Chaperone: n/a      Laura Ellis is here for her return OB visit. admits to concerns today. Very uncomfortable back pain, lily hick, pelvic pressure, cramping  She reports  feeling fetal movement. She denies vaginal bleeding. She denies vaginal discharge. She denies leaking of fluid. She denies uterine cramping. She denies  nausea and/or vomiting. OBJECTIVE:  Blood pressure 118/78, weight 255 lb (115.7 kg), last menstrual period 2022, currently breastfeeding. Total weight gain: 33 lb (15 kg)    SSE: cervix visualized, closed. NO bleeding from cervical or in vaginal vault    Laura Ellis accepted the Tdap vaccine as appropriate      ASSESSMENT/PLAN:  IUP @ 34+4 weeks  S =D    High risk pregnancy, antepartum  Due date is based on LMP and confirmed with 7w3d early dating ultrasound  Patient's prenatal labs are completed. Patient's blood type O positive and rhogam is not indicated in pregnancy. Early glucola indicated: yes - not completed  Patient accepted genetic screening. NIPS normal  Anatomy scan completed. Placenta anterior,normal cord insertion: Yes, cervical length 3.87cm, yes - 17.7mm from os low lying, no previa noted. Follow up at 26 weeks. 22 Fetal echo WNL EFW 57% NO LOW LYING placentation, f/up as clinically indicated  Glucola between 24-28 weeks. completed   Pass  97  Total weight gain in pregnancy reviewed: Yes  Fetal Kick Count was discussed and explained. She was instructed to lay on her left side 20 minutes after eating and count movements for up to 2 hours with a target value of 10 movements. Instructed to notify office if she does not make the target.   GBS discussed and patient agreeable  Reviewed signs and symptoms of  labor: yes  Reviewed signs and symptoms of preeclampsia: yes  Reviewed signs and symptoms of lily hick contractions: yes  Reviewed growth ultrasound between 34-36 weeks in office: Yes scheduled      2. 34 weeks gestation of pregnancy  - Vaginitis DNA Probe; Future  - Culture, Urine; Future    3. Cramping affecting pregnancy, antepartum  - Reviewed in depth signs and symptom of  labor, lily bansal and preeclampsia. - Cervix visually closed on exam today  - Reviewed rest/hydration  - Cultures sent to rule out infection  - Vaginitis DNA Probe; Future  - Culture, Urine; Future    4. Need for diphtheria-tetanus-pertussis (Tdap) vaccine  - Vaccine information statement tdap edition 2020 given to patient on 2023  - Tdap, BOOSTRIX, (age 8 yrs+), IM  - NM THERAPEUTIC PROPHYLACTIC/DX INJECTION SUBQ/IM    5. Bipolar disease during pregnancy, antepartum ([facility] [address]  - Reports doing well offers no concerns    6. Obesity affecting pregnancy, antepartum        She was counseled regarding all of the above:    Return in about 2 weeks (around 3/2/2023) for Return OB, GBS, growth ultrasound with [employer].     The patient, Isreal Hernández,  was seen with a total time spent of 25 minutes for the visit on this date of service by the [facility]  On this date 2023,  I have spent greater than 50% of this visit:  [x] reviewing previous notes  [x] reviewing test results  [x] pre-charting  Discussed with patient:  [x] Importance of compliance with treatment plan  [x] Counseling  [] Coordinating care  [x] Documenting     Electronically Signed By: HUGO Sullivan CNM

## 2023-02-17 LAB
MICROORGANISM SPEC CULT: ABNORMAL
SPECIMEN DESCRIPTION: ABNORMAL

## 2023-02-21 ENCOUNTER — TELEPHONE (OUTPATIENT)
Dept: OBGYN CLINIC | Age: 28
End: 2023-02-21

## 2023-02-21 DIAGNOSIS — B95.1 POSITIVE GBS TEST: Primary | ICD-10-CM

## 2023-02-21 NOTE — TELEPHONE ENCOUNTER
----- Message from [facility], Edwin Olivarezvard sent at 2/21/2023  8:50 AM EST -----  Positive GBS at less than <[id number] resulted at low count. Need to treat in labor. If symptomatic can treat with atb. Let me know what patient wants to do.

## 2023-02-23 ENCOUNTER — ROUTINE PRENATAL (OUTPATIENT)
Dept: OBGYN CLINIC | Age: 28
End: 2023-02-23
Payer: MEDICAID

## 2023-02-23 VITALS — BODY MASS INDEX: 46.27 KG/M2 | SYSTOLIC BLOOD PRESSURE: 120 MMHG | DIASTOLIC BLOOD PRESSURE: 70 MMHG | WEIGHT: 253 LBS

## 2023-02-23 DIAGNOSIS — O09.90 HIGH RISK PREGNANCY, ANTEPARTUM: Primary | ICD-10-CM

## 2023-02-23 DIAGNOSIS — R82.71 GBS BACTERIURIA: ICD-10-CM

## 2023-02-23 DIAGNOSIS — Z3A.35 35 WEEKS GESTATION OF PREGNANCY: ICD-10-CM

## 2023-02-23 DIAGNOSIS — F31.9 BIPOLAR DISEASE DURING PREGNANCY, ANTEPARTUM (HCC): ICD-10-CM

## 2023-02-23 DIAGNOSIS — O99.340 BIPOLAR DISEASE DURING PREGNANCY, ANTEPARTUM (HCC): ICD-10-CM

## 2023-02-23 PROCEDURE — 99213 OFFICE O/P EST LOW 20 MIN: CPT | Performed by: ADVANCED PRACTICE MIDWIFE

## 2023-02-23 RX ORDER — AMPICILLIN 500 MG/1
500 CAPSULE ORAL 4 TIMES DAILY
Qty: 40 CAPSULE | Refills: 0 | Status: SHIPPED | OUTPATIENT
Start: 2023-02-23 | End: 2023-03-05

## 2023-02-23 NOTE — PROGRESS NOTES
SUBJECTIVE:    Bob Stephenson is here for her return OB visit. denies concerns today. Still having cramping and back pain. NO abnormal discharge/vaginal irritation  She reports  feeling fetal movement. She denies vaginal bleeding. She denies vaginal discharge. She denies leaking of fluid. She denies uterine cramping. She denies  nausea and/or vomiting. OBJECTIVE:  Blood pressure 120/70, weight 253 lb (114.8 kg), last menstrual period 2022, currently breastfeeding. Total weight gain: 31 lb (14.1 kg)    Bob Stephenson accepted the Tdap vaccine as appropriate    ASSESSMENT/PLAN:  IUP @ 35+4 weeks  S =D     High risk pregnancy, antepartum  Due date is based on LMP and confirmed with 7w3d early dating ultrasound  Patient's prenatal labs are completed. Patient's blood type O positive and rhogam is not indicated in pregnancy. Early glucola indicated: yes - not completed  Patient accepted genetic screening. NIPS normal  Anatomy scan completed. Placenta anterior,normal cord insertion: Yes, cervical length 3.87cm, yes - 17.7mm from os low lying, no previa noted. Follow up at 26 weeks. 22 Fetal echo WNL EFW 57% NO LOW LYING placentation, f/up as clinically indicated  Glucola between 24-28 weeks. completed   Pass  97  Total weight gain in pregnancy reviewed: Yes  Fetal Kick Count was discussed and explained. She was instructed to lay on her left side 20 minutes after eating and count movements for up to 2 hours with a target value of 10 movements. Instructed to notify office if she does not make the target. GBS discussed and patient agreeable  Reviewed signs and symptoms of  labor: yes  Reviewed signs and symptoms of preeclampsia: yes  Reviewed signs and symptoms of lily hick contractions: yes  Reviewed   Reviewed growth ultrasound between 34-36 weeks in office: Yes  completed prior to appointment EFW 32.7% STACIE 8.05      2.  35 weeks gestation of pregnancy  - Reviewed common discomforts of pregnancy in the third trimester and comfort measures    3. Bipolar disease during pregnancy, antepartum ([address]  - Reports doing well denies any concerns    4. GBS bacteriuria  - Reviewed based on continued cramping will proceed with antibiotic therapy for GBS in urine.  - Reviewed will treat in labor patient verbalized understnading  - ampicillin (PRINCIPEN) 500 MG capsule; Take 1 capsule by mouth 4 times daily for 10 days  Dispense: 40 capsule; Refill: 0        She was counseled regarding all of the above:    Return in about 1 week (around 3/2/2023) for Return OB.     The patient, Hamzah Rahman,  was seen with a total time spent of 25 minutes for the visit on this date of service by the [facility]  On this date February 23, 2023,  I have spent greater than 50% of this visit:  [x] reviewing previous notes  [x] reviewing test results  [x] pre-charting  Discussed with patient:  [x] Importance of compliance with treatment plan  [x] Counseling  [] Coordinating care  [x] Documenting     Electronically Signed By: HUGO Bond CNM

## 2023-03-02 ENCOUNTER — TELEPHONE (OUTPATIENT)
Dept: OBGYN CLINIC | Age: 28
End: 2023-03-02

## 2023-03-02 NOTE — TELEPHONE ENCOUNTER
Pt is currently 36 weeks pregnant was tested positive for GBS and recently started on ampicillin. She states she is getting a yeast infection from the antibiotic. Pt is requesting meds to be sent to pharmacy.

## 2023-03-02 NOTE — TELEPHONE ENCOUNTER
Pt advised of recommendations. Offered patient appointment, pt declined and stated she would wait until her next ob visit. She stated she just had some itching she wasn't sure if it was a yeast infection. Pt advised to contact the office if needed or if she wanted an appointment.

## 2023-03-06 ENCOUNTER — TELEPHONE (OUTPATIENT)
Dept: OBGYN CLINIC | Age: 28
End: 2023-03-06

## 2023-03-06 NOTE — TELEPHONE ENCOUNTER
Pt called in last week with what she was yeast infection- was unable to come in for an appt- she did OTC 1 day monistat- she has been experiencing vaginal pain but today woke up and it is even worse- to the point hurst to touch- explained with out an exam hard to tell- but we do get a lot of blood flow to that area- we can get swelling pain even varicose veins or varicosities to that area- pt was offered to come in tomorrow but really wants to keep her appt with jailyn Thursday- asked if she has tried ice, rest, or even bath to help- she stated no-     Stated will get a message to agueda to see if she has any other recommendations.

## 2023-03-07 NOTE — TELEPHONE ENCOUNTER
Unfortunately she would need to be examined being pregnant we would want to appropriately treat her and evaluate her if she can come in sooner I am sure chong or myself will see her and she can still keep her appointment with Dr. Karolina Henriquez or even switch her appointment with Dr. Karolina Henriquez to virtual so she can still talk to him but not have to come back in for appt.

## 2023-03-07 NOTE — TELEPHONE ENCOUNTER
Pt informed of her recommendations and as of now sx of pain has improved just feels irritated- as of now she will keep her appt on Thursday with dr glaser but aware if she needs seen sooner to madi the office.

## 2023-03-09 ENCOUNTER — ROUTINE PRENATAL (OUTPATIENT)
Dept: OBGYN CLINIC | Age: 28
End: 2023-03-09
Payer: MEDICAID

## 2023-03-09 ENCOUNTER — HOSPITAL ENCOUNTER (OUTPATIENT)
Age: 28
Setting detail: SPECIMEN
Discharge: HOME | End: 2023-03-09

## 2023-03-09 VITALS — SYSTOLIC BLOOD PRESSURE: 120 MMHG | BODY MASS INDEX: 47.01 KG/M2 | DIASTOLIC BLOOD PRESSURE: 70 MMHG | WEIGHT: 257 LBS

## 2023-03-09 DIAGNOSIS — N89.8 VAGINAL DISCHARGE: ICD-10-CM

## 2023-03-09 DIAGNOSIS — N89.8 VAGINAL IRRITATION: ICD-10-CM

## 2023-03-09 DIAGNOSIS — O09.90 HIGH RISK PREGNANCY, ANTEPARTUM: Primary | ICD-10-CM

## 2023-03-09 DIAGNOSIS — O99.340 BIPOLAR DISEASE DURING PREGNANCY, ANTEPARTUM (HCC): ICD-10-CM

## 2023-03-09 DIAGNOSIS — R82.71 GBS BACTERIURIA: ICD-10-CM

## 2023-03-09 DIAGNOSIS — N89.8 VAGINAL ITCHING: ICD-10-CM

## 2023-03-09 DIAGNOSIS — Z3A.37 37 WEEKS GESTATION OF PREGNANCY: ICD-10-CM

## 2023-03-09 DIAGNOSIS — F31.9 BIPOLAR DISEASE DURING PREGNANCY, ANTEPARTUM (HCC): ICD-10-CM

## 2023-03-09 RX ORDER — NYSTATIN 100000 [USP'U]/G
POWDER TOPICAL
Qty: 60 G | Refills: 0 | Status: SHIPPED | OUTPATIENT
Start: 2023-03-09

## 2023-03-09 RX ORDER — FLUCONAZOLE 150 MG/1
150 TABLET ORAL
Qty: 2 TABLET | Refills: 0 | Status: SHIPPED | OUTPATIENT
Start: 2023-03-09

## 2023-03-09 NOTE — PROGRESS NOTES
Krista Peres is a 32 y.o. female 37w4d        OB History    Para Term  AB Living   1 0 0 0 0 0   SAB IAB Ectopic Molar Multiple Live Births   0 0 0 0 0 0      # Outcome Date GA Lbr Maciel/2nd Weight Sex Delivery Anes PTL Lv   1 Current                Vitals  BP: 120/70  Weight: 257 lb (116.6 kg)  Patient Position: Sitting  Albumin: Negative  Glucose: Negative  Fundal Height (cm): 38 cm  Fetal HR: 150 by US  Movement: Present  Dilation (cm): Closed  Effacement: 50  Station: -3      The patient was seen and evaluated. There was positive fetal movements. No contractions or leakage of fluid. Signs and symptoms of labor were reviewed. The S/S of Pre-Eclampsia were reviewed with the patient in detail. She is to report any of these if they occur. She currently denies any of these. Vaginal irritation and itching x2 weeks, she attempted over the counter topical but had reaction. She is concerned about this. Cx obtained, no odor, small amount of white discharge. The patient was instructed on fetal kick counts and was given a kick sheet to complete every 8 hours. She was instructed that the baby should move at a minimum of ten times within one hour after a meal. The patient was instructed to lay down on her left side twenty minutes after eating and count movements for up to one hour with a target value of ten movements. She was instructed to notify the office if she did not make that target after two attempts or if after any attempt there was less than four movements. The patient reports that the targets have been made Yes. Insurance [facility] [employer] Plus        Plans to deliver: [facility]    [facility]    1st trimester screen/NIPs: NIPS collected 22. NEG cfDNA/NIPS      AFP- screening negative    Fetal Echo    High Risk:  Chews Tobacco- stopped when found out she was pregnant, Tobacco use in pregnancy, vaping.  DO NOT TALK ABOUT IN FRONT OF PARTNER  Hx of Anxiety and Depression  - reluxti, buspar and zoloft for bipolar    Early 1 hr GTT: 22 Ordered     Covid Vaccine: J&J  booster with Pfizer    Flu Vaccine: received 2022     Tdap: received 2023     Rhogam: n/a    1hr GTT:97      GBS: in urine, needs treated in labor    2nd trimester appointment with Dr. Sherie Bazan : 22    3rd trimester appointment with Dr. Sherie Bazan : 23      T-Dap Vaccine Completed (27-36 weeks): Yes    Allergies: Allergies as of 2023 - Fully Reviewed 2023   Allergen Reaction Noted    Sulfamethoxazole-trimethoprim Hives 10/24/2015         Group Beta Strep collection was completed. No: GBS bacteruria  GBS Results:   No visits with results within 3 Week(s) from this visit. Latest known visit with results is:   Hospital Outpatient Visit on 2023   Component Date Value Ref Range Status    Specimen Description 2023 . CLEAN CATCH URINE   Final    Culture 2023  (A)   Final                    Value:STREPTOCOCCI, BETA HEMOLYTIC GROUP B <55075 CFU/ML Group B strep is identified at any colony count in a female who could potentially be pregnant based on age only. Group B strep isolated in urine at any colony count is considered a surrogate for vaginal rectal colonization in the context of determining shelly- therapy. Treatment for UTI or asymptomatic bacteriuria should be based on colony count and clinical symptoms and not on the presence of the organism alone. Source 2023 . VAGINAL SWAB   Final    Trichomonas Vaginalis DNA 2023 NEGATIVE  NEGATIVE Final    for Trichomonas Vaginalis    Gardnerella Vaginalis, DNA Probe 2023 NEGATIVE  NEGATIVE Final    for Gardnerella vaginalis    Candida Species, DNA Probe 2023 NEGATIVE  NEGATIVE Final    Comment: for Candida sp.   Method of testing is a DNA probe intended for detection and identification of Candida   species, Gardnerella vaginalis, and Trichomonas vaginalis nucleic acid in vaginal fluid   specimens from patients with symptoms of vaginitis/vaginosis.     ]        Cervical Exam was:   closed cm dilated, 50 effaced, -3 station, cervical position post , consistency soft      The patient was counseled on the mandatory call ahead policy. She has been instructed to call the office at anytime prior to going into the hospital so the on-call physician may direct her to the appropriate facility for care. Exceptions were reviewed including but not limited to: Decreased fetal movement, vaginal Bleeding or hemorrhage, trauma, readily expectant delivery, or any instance where she feels 911 should be utilized. The patient was counseled on Labor & Delivery. Route of delivery and counseling on vaginal, operative vaginal, and  sections were completed with the risks of each to both the patient as well as her baby. The possibility of a blood transfusion was discussed as well. The patient was not opposed to receiving a transfusion if needed. The patient was counseled on types of analgesia during labor and is considering either Regional or IV medication the risks, benefits and alternatives were discussed.  Testing:  None                Assessment:  1. Ahmet Mohan is a 32 y.o. female  2.   3. 37w4d    Patient Active Problem List    Diagnosis Date Noted    HRP (high risk pregnancy), second trimester 2022     Priority: Medium    High risk pregnancy, antepartum 2022     Priority: Medium    Bipolar disease during pregnancy, antepartum ([facility] 2022     Priority: Medium    Obesity affecting pregnancy, antepartum 2022     Priority: Medium    Depression 2021    Anxiety 2021    Dysmenorrhea         Diagnosis Orders   1. High risk pregnancy, antepartum        2. 37 weeks gestation of pregnancy        3. Vaginal itching  Vaginitis DNA Probe    fluconazole (DIFLUCAN) 150 MG tablet    nystatin (MYCOSTATIN) 049054 UNIT/GM powder      4.  Vaginal discharge  Vaginitis DNA Probe    fluconazole (DIFLUCAN) 150 MG tablet    nystatin (MYCOSTATIN) 938258 UNIT/GM powder      5. Vaginal irritation  Vaginitis DNA Probe    fluconazole (DIFLUCAN) 150 MG tablet    nystatin (MYCOSTATIN) 514633 UNIT/GM powder      6. Bipolar disease during pregnancy, antepartum ([facility]        7. GBS bacteriuria                Plan:  The patient will return to the office for her next visit in 1 weeks. See antepartum flow sheet. Counseled on s/s of preeclampsia. Counseled on s/s of  labor. Fetal movement discussed. Patient was seen with total face to face time of 20 minutes. More than 50% of this visit was on counseling and education regarding her    Diagnosis Orders   1. High risk pregnancy, antepartum        2. 37 weeks gestation of pregnancy        3. Vaginal itching  Vaginitis DNA Probe    fluconazole (DIFLUCAN) 150 MG tablet    nystatin (MYCOSTATIN) 124274 UNIT/GM powder      4. Vaginal discharge  Vaginitis DNA Probe    fluconazole (DIFLUCAN) 150 MG tablet    nystatin (MYCOSTATIN) 327308 UNIT/GM powder      5. Vaginal irritation  Vaginitis DNA Probe    fluconazole (DIFLUCAN) 150 MG tablet    nystatin (MYCOSTATIN) 173708 UNIT/GM powder      6. Bipolar disease during pregnancy, antepartum ([address]        7. GBS bacteriuria         and her options. She was also counseled on her preventative health maintenance recommendations and follow-up.

## 2023-03-16 ENCOUNTER — ROUTINE PRENATAL (OUTPATIENT)
Dept: OBGYN CLINIC | Age: 28
End: 2023-03-16
Payer: MEDICAID

## 2023-03-16 VITALS — BODY MASS INDEX: 47.37 KG/M2 | SYSTOLIC BLOOD PRESSURE: 120 MMHG | WEIGHT: 259 LBS | DIASTOLIC BLOOD PRESSURE: 74 MMHG

## 2023-03-16 DIAGNOSIS — O28.8 AFI (AMNIOTIC FLUID INDEX) BORDERLINE LOW: ICD-10-CM

## 2023-03-16 DIAGNOSIS — O09.90 HIGH RISK PREGNANCY, ANTEPARTUM: Primary | ICD-10-CM

## 2023-03-16 DIAGNOSIS — Z3A.38 38 WEEKS GESTATION OF PREGNANCY: ICD-10-CM

## 2023-03-16 PROCEDURE — 99213 OFFICE O/P EST LOW 20 MIN: CPT | Performed by: ADVANCED PRACTICE MIDWIFE

## 2023-03-16 PROCEDURE — 1036F TOBACCO NON-USER: CPT | Performed by: ADVANCED PRACTICE MIDWIFE

## 2023-03-16 PROCEDURE — G8417 CALC BMI ABV UP PARAM F/U: HCPCS | Performed by: ADVANCED PRACTICE MIDWIFE

## 2023-03-16 PROCEDURE — G8482 FLU IMMUNIZE ORDER/ADMIN: HCPCS | Performed by: ADVANCED PRACTICE MIDWIFE

## 2023-03-16 PROCEDURE — G8427 DOCREV CUR MEDS BY ELIG CLIN: HCPCS | Performed by: ADVANCED PRACTICE MIDWIFE

## 2023-03-16 SDOH — ECONOMIC STABILITY: HOUSING INSECURITY
IN THE LAST 12 MONTHS, WAS THERE A TIME WHEN YOU DID NOT HAVE A STEADY PLACE TO SLEEP OR SLEPT IN A SHELTER (INCLUDING NOW)?: NO

## 2023-03-16 SDOH — ECONOMIC STABILITY: FOOD INSECURITY: WITHIN THE PAST 12 MONTHS, YOU WORRIED THAT YOUR FOOD WOULD RUN OUT BEFORE YOU GOT MONEY TO BUY MORE.: NEVER TRUE

## 2023-03-16 SDOH — ECONOMIC STABILITY: INCOME INSECURITY: HOW HARD IS IT FOR YOU TO PAY FOR THE VERY BASICS LIKE FOOD, HOUSING, MEDICAL CARE, AND HEATING?: NOT HARD AT ALL

## 2023-03-16 SDOH — ECONOMIC STABILITY: FOOD INSECURITY: WITHIN THE PAST 12 MONTHS, THE FOOD YOU BOUGHT JUST DIDN'T LAST AND YOU DIDN'T HAVE MONEY TO GET MORE.: NEVER TRUE

## 2023-03-16 NOTE — PROGRESS NOTES
SUBJECTIVE:    Rainer Jamil is here for her return OB visit. denies concerns today. Wants to schedule an induction does not want to go past 40 weeks. She reports  feeling fetal movement. She denies vaginal bleeding. She denies vaginal discharge. She denies leaking of fluid. She denies uterine cramping. She denies  nausea and/or vomiting. OBJECTIVE:  Blood pressure 120/74, weight 259 lb (117.5 kg), last menstrual period 2022, currently breastfeeding. Total weight gain: 37 lb (16.8 kg)    SVE /-2    Rainer Jamil accepted the Tdap vaccine as appropriate    ASSESSMENT/PLAN:  IUP @ 38+4 weeks  S =D    High Risk Pregnancy  Due date is based on LMP and confirmed with 7w3d early dating ultrasound  Patient's prenatal labs are completed. Patient's blood type O positive and rhogam is not indicated in pregnancy. Early glucola indicated: yes - not completed  Patient accepted genetic screening. NIPS normal  Anatomy scan completed. Placenta anterior,normal cord insertion: Yes, cervical length 3.87cm, yes - 17.7mm from os low lying, no previa noted. Follow up at 26 weeks. 22 Fetal echo WNL EFW 57% NO LOW LYING placentation, f/up as clinically indicated  Glucola between 24-28 weeks. completed   Pass  97  Total weight gain in pregnancy reviewed: Yes  Fetal Kick Count was discussed and explained. She was instructed to lay on her left side 20 minutes after eating and count movements for up to 2 hours with a target value of 10 movements. Instructed to notify office if she does not make the target.   GBS discussed and patient agreeable  Reviewed signs and symptoms of  labor: yes  Reviewed signs and symptoms of preeclampsia: yes  Reviewed signs and symptoms of lily hick contractions: yes  Reviewed   Reviewed growth ultrasound between 34-36 weeks in office: Yes  completed prior to appointment EFW 32.7% STACIE 8.05      2. 38 weeks gestation of pregnancy  - Reviewed risks/benefits/alternative to an elective induction. Patient verbalized understanding and desires to proceed with induction of labor. Induction of labor scheduled for 3/25/23 at 11am. Confirmed with Lisa Mares RN. Reviewed with patient to call unit ([phone]) 1 hour prior to her scheduled induction. Patient verbalized understanding. 3. STACIE (amniotic fluid index) borderline low  2/23/23 STACIE 8.03  3/9/23 STACIE 15.81  3/16/23 STACIE 9.48      She was counseled regarding all of the above:    Return in about 1 week (around 3/23/2023) for Return OB.     The patient, Holger John,  was seen with a total time spent of 25 minutes for the visit on this date of service by the [facility]  On this date March 16, 2023,  I have spent greater than 50% of this visit:  [x] reviewing previous notes  [x] reviewing test results  [x] pre-charting  Discussed with patient:  [x] Importance of compliance with treatment plan  [x] Counseling  [] Coordinating care  [x] Documenting     Electronically Signed By: HUGO Javier CNM

## 2023-03-20 ENCOUNTER — HOSPITAL ENCOUNTER (OUTPATIENT)
Age: 28
Discharge: HOME | DRG: 542 | End: 2023-03-20
Attending: OBSTETRICS & GYNECOLOGY | Admitting: OBSTETRICS & GYNECOLOGY
Payer: MEDICAID

## 2023-03-20 ENCOUNTER — APPOINTMENT (OUTPATIENT)
Dept: GENERAL RADIOLOGY | Age: 28
DRG: 542 | End: 2023-03-20
Payer: MEDICAID

## 2023-03-20 ENCOUNTER — APPOINTMENT (OUTPATIENT)
Dept: CT IMAGING | Age: 28
DRG: 542 | End: 2023-03-20
Payer: MEDICAID

## 2023-03-20 ENCOUNTER — HOSPITAL ENCOUNTER (EMERGENCY)
Age: 28
Discharge: HOME | DRG: 542 | End: 2023-03-20
Attending: EMERGENCY MEDICINE
Payer: MEDICAID

## 2023-03-20 VITALS
RESPIRATION RATE: 16 BRPM | SYSTOLIC BLOOD PRESSURE: 102 MMHG | HEART RATE: 85 BPM | TEMPERATURE: 97.4 F | OXYGEN SATURATION: 97 % | DIASTOLIC BLOOD PRESSURE: 61 MMHG

## 2023-03-20 VITALS
DIASTOLIC BLOOD PRESSURE: 82 MMHG | RESPIRATION RATE: 18 BRPM | TEMPERATURE: 97.8 F | OXYGEN SATURATION: 100 % | HEART RATE: 85 BPM | SYSTOLIC BLOOD PRESSURE: 124 MMHG

## 2023-03-20 DIAGNOSIS — Z34.93 THIRD TRIMESTER PREGNANCY: ICD-10-CM

## 2023-03-20 DIAGNOSIS — R06.02 SHORTNESS OF BREATH: Primary | ICD-10-CM

## 2023-03-20 PROBLEM — R82.71 GBS BACTERIURIA: Status: ACTIVE | Noted: 2023-03-20

## 2023-03-20 PROBLEM — R03.0 ELEVATED BP WITHOUT DIAGNOSIS OF HYPERTENSION: Status: ACTIVE | Noted: 2023-03-20

## 2023-03-20 PROBLEM — Z14.1 CYSTIC FIBROSIS CARRIER: Status: ACTIVE | Noted: 2023-03-20

## 2023-03-20 PROBLEM — F12.10 TETRAHYDROCANNABINOL (THC) USE DISORDER, MILD, ABUSE: Status: ACTIVE | Noted: 2023-03-20

## 2023-03-20 LAB
ALBUMIN SERPL-MCNC: 3.3 G/DL (ref 3.5–5.2)
ALBUMIN/GLOBULIN RATIO: 1.1 (ref 1–2.5)
ALP SERPL-CCNC: 122 U/L (ref 35–104)
ALT SERPL-CCNC: 10 U/L (ref 5–33)
ANION GAP SERPL CALCULATED.3IONS-SCNC: 12 MMOL/L (ref 9–17)
AST SERPL-CCNC: 14 U/L
BACTERIA: ABNORMAL
BILIRUB SERPL-MCNC: 0.2 MG/DL (ref 0.3–1.2)
BILIRUBIN URINE: NEGATIVE
BUN SERPL-MCNC: 8 MG/DL (ref 6–20)
CALCIUM SERPL-MCNC: 9.2 MG/DL (ref 8.6–10.4)
CASTS UA: ABNORMAL /LPF (ref 0–8)
CHLORIDE SERPL-SCNC: 103 MMOL/L (ref 98–107)
CO2 SERPL-SCNC: 17 MMOL/L (ref 20–31)
COLOR: YELLOW
CREAT SERPL-MCNC: 0.39 MG/DL (ref 0.5–0.9)
CREATININE URINE: 98.8 MG/DL (ref 28–217)
D DIMER BLD IA.RAPID-MCNC: 1.3 MG/L FEU
EPITHELIAL CELLS UA: ABNORMAL /HPF (ref 0–5)
GFR SERPL CREATININE-BSD FRML MDRD: >60 ML/MIN/1.73M2
GLUCOSE SERPL-MCNC: 88 MG/DL (ref 70–99)
GLUCOSE UR STRIP.AUTO-MCNC: NEGATIVE MG/DL
HCT VFR BLD AUTO: 36.5 % (ref 36.3–47.1)
HGB BLD-MCNC: 11.9 G/DL (ref 11.9–15.1)
KETONES UR STRIP.AUTO-MCNC: NEGATIVE MG/DL
LEUKOCYTE ESTERASE UR QL STRIP.AUTO: NEGATIVE
MCH RBC QN AUTO: 30.3 PG (ref 25.2–33.5)
MCHC RBC AUTO-ENTMCNC: 32.6 G/DL (ref 28.4–34.8)
MCV RBC AUTO: 92.9 FL (ref 82.6–102.9)
NITRITE UR QL STRIP.AUTO: NEGATIVE
NRBC AUTOMATED: 0 PER 100 WBC
PDW BLD-RTO: 13.9 % (ref 11.8–14.4)
PLATELET # BLD AUTO: 349 K/UL (ref 138–453)
PMV BLD AUTO: 9.9 FL (ref 8.1–13.5)
POTASSIUM SERPL-SCNC: 3.9 MMOL/L (ref 3.7–5.3)
PROT SERPL-MCNC: 6.3 G/DL (ref 6.4–8.3)
PROT UR STRIP.AUTO-MCNC: 6 MG/DL (ref 5–8)
PROT UR STRIP.AUTO-MCNC: NEGATIVE MG/DL
RBC # BLD: 3.93 M/UL (ref 3.95–5.11)
RBC CLUMPS #/AREA URNS AUTO: ABNORMAL /HPF (ref 0–4)
SODIUM SERPL-SCNC: 132 MMOL/L (ref 135–144)
SPECIFIC GRAVITY UA: 1.02 (ref 1–1.03)
TOTAL PROTEIN, URINE: 9 MG/DL
TROPONIN I SERPL DL<=0.01 NG/ML-MCNC: <6 NG/L (ref 0–14)
TROPONIN I SERPL DL<=0.01 NG/ML-MCNC: <6 NG/L (ref 0–14)
TURBIDITY: ABNORMAL
URINE HGB: NEGATIVE
URINE TOTAL PROTEIN CREATININE RATIO: 0.09 (ref 0–0.2)
UROBILINOGEN, URINE: NORMAL
WBC # BLD AUTO: 14.4 K/UL (ref 3.5–11.3)
WBC UA: ABNORMAL /HPF (ref 0–5)

## 2023-03-20 PROCEDURE — 85379 FIBRIN DEGRADATION QUANT: CPT

## 2023-03-20 PROCEDURE — 85027 COMPLETE CBC AUTOMATED: CPT

## 2023-03-20 PROCEDURE — 93005 ELECTROCARDIOGRAM TRACING: CPT | Performed by: STUDENT IN AN ORGANIZED HEALTH CARE EDUCATION/TRAINING PROGRAM

## 2023-03-20 PROCEDURE — 99213 OFFICE O/P EST LOW 20 MIN: CPT

## 2023-03-20 PROCEDURE — 99285 EMERGENCY DEPT VISIT HI MDM: CPT

## 2023-03-20 PROCEDURE — 84156 ASSAY OF PROTEIN URINE: CPT

## 2023-03-20 PROCEDURE — 80053 COMPREHEN METABOLIC PANEL: CPT

## 2023-03-20 PROCEDURE — 6360000004 HC RX CONTRAST MEDICATION: Performed by: STUDENT IN AN ORGANIZED HEALTH CARE EDUCATION/TRAINING PROGRAM

## 2023-03-20 PROCEDURE — 84484 ASSAY OF TROPONIN QUANT: CPT

## 2023-03-20 PROCEDURE — 82570 ASSAY OF URINE CREATININE: CPT

## 2023-03-20 PROCEDURE — 81001 URINALYSIS AUTO W/SCOPE: CPT

## 2023-03-20 PROCEDURE — 71046 X-RAY EXAM CHEST 2 VIEWS: CPT

## 2023-03-20 PROCEDURE — 71260 CT THORAX DX C+: CPT | Performed by: STUDENT IN AN ORGANIZED HEALTH CARE EDUCATION/TRAINING PROGRAM

## 2023-03-20 RX ADMIN — IOPAMIDOL 75 ML: 755 INJECTION, SOLUTION INTRAVENOUS at 02:01

## 2023-03-20 ASSESSMENT — ENCOUNTER SYMPTOMS
SHORTNESS OF BREATH: 1
ABDOMINAL PAIN: 1
BACK PAIN: 1

## 2023-03-20 NOTE — FLOWSHEET NOTE
Patient seen in ED for SOB. Resoled and brought up to L&D for a 20 minute tracing. No OB complaints. EFM applied, heart tones 125.

## 2023-03-20 NOTE — ED PROVIDER NOTES
[address] [facility]  ED  Emergency Department Encounter  Emergency Medicine Resident     Pt Name:Karlee Vasquez  MRN: [id number]  [place] 1995  Date of evaluation: 3/20/23  PCP:  Rj Sarmiento      CHIEF COMPLAINT       Chief Complaint   Patient presents with    Shortness of Breath    Dizziness       HISTORY OF PRESENT ILLNESS  (Location/Symptom, Timing/Onset, Context/Setting, Quality, Duration, Modifying Factors, Severity.)      Coretta Hoyt is a 32 y.o. female who presents with acute onset shortness of breath. 39 weeks pregnant. G1, P0. History of chewing tobacco and vaping. States that she was listening to rhythmic music trying to fall asleep when she had acute onset shortness of breath. She is also complaining of right-sided rib pain and side pain. She states that she had had left-sided pain that she spoke with her OB team about before and they had stated that it was likely due to fetal movement. She does have history of acid reflux, depression, IBS and overactive bladder. She denies chest pain, hemoptysis, or any recent illness or fever. PAST MEDICAL / SURGICAL / SOCIAL / FAMILY HISTORY      has a past medical history of Acid reflux, Anemia, Bipolar 1 disorder ([facility] Depression, Dysmenorrhea, Irritable bowel syndrome, and Overactive bladder. has a past surgical history that includes Colposcopy.       Social History     Socioeconomic History    Marital status: Single     Spouse name: Not on file    Number of children: Not on file    Years of education: Not on file    Highest education level: Not on file   Occupational History    Not on file   Tobacco Use    Smoking status: Never    Smokeless tobacco: Former     Types: Chew    Tobacco comments:     Chewing tobacco   Vaping Use    Vaping Use: Never used   Substance and Sexual Activity    Alcohol use: Not Currently    Drug use: Never    Sexual activity: Yes     Partners: Male   Other Topics Concern    Not on file   Social History Review of Systems   Constitutional:  Negative for chills and fatigue. Respiratory:  Positive for shortness of breath. Cardiovascular:  Negative for chest pain. Gastrointestinal:  Positive for abdominal pain. Genitourinary:  Positive for flank pain. Musculoskeletal:  Positive for back pain. PHYSICAL EXAM      INITIAL VITALS:   /61   Pulse 85   Temp 97.4 °F (36.3 °C) (Oral)   Resp 18   LMP 06/19/2022   SpO2 97%     Physical Exam  Constitutional:       General: She is not in acute distress. Appearance: She is well-developed. She is obese. She is not ill-appearing. HENT:      Head: Normocephalic and atraumatic. Cardiovascular:      Rate and Rhythm: Normal rate and regular rhythm. Pulmonary:      Effort: Pulmonary effort is normal. No tachypnea, accessory muscle usage or respiratory distress. Breath sounds: No decreased breath sounds, wheezing, rhonchi or rales. Chest:      Chest wall: No tenderness. Abdominal:      Tenderness: There is no guarding. Comments: gravid   Skin:     General: Skin is warm and dry. Capillary Refill: Capillary refill takes less than 2 seconds. Findings: No rash. Neurological:      General: No focal deficit present. Mental Status: She is alert and oriented to person, place, and time. Cranial Nerves: No cranial nerve deficit. Psychiatric:         Mood and Affect: Mood normal.         Behavior: Behavior normal.         DDX/DIAGNOSTIC RESULTS / EMERGENCY DEPARTMENT COURSE / MDM     Medical Decision Making  Amount and/or Complexity of Data Reviewed  Labs: ordered. Decision-making details documented in ED Course. Radiology: ordered. ECG/medicine tests: ordered. Risk  Prescription drug management.           EMERGENCY DEPARTMENT COURSE:    ED Course as of 03/20/23 0336   Mon Mar 20, 2023   0044  [TJ]   0135 D-Dimer, Quant: 1.30 [TJ]      ED Course User Index  [TJ] Hema Vigil MD       -Work-up today for this

## 2023-03-20 NOTE — CONSULTS
Obstetric/Gynecology Resident Note    Informed by ER that patient presented with sudden onset chest pain. Plan for workup to be completed in ER. She is 39w1d with no current obstetric complaints. Recommend complete workup for sudden onset chest pain and once cleared present to L&D for fetal monitoring and any additional workup.     Raj Juarez DO  OB/GYN Resident, [facility]  3/20/2023, 3:11 AM

## 2023-03-20 NOTE — PROGRESS NOTES
TESTING NOTE    Zhane Claudio is a 32 y.o. female  at 36w3d    The patient was seen and examined. She is here today for  testing after being seen in the Emergency Department with acute onset shortness of breath. ED workup was negative for acute cardiopulmonary process. She reports feeling better. The baby is moving well and she denies any complaints. Patient Active Problem List   Diagnosis    Depression    Anxiety    Dysmenorrhea    High risk pregnancy, antepartum    Bipolar disease during pregnancy, antepartum ([address]    Obesity affecting pregnancy, antepartum    HRP (high risk pregnancy), second trimester    Elevated BP x1    THC Use    GBS bacteriuria    Cystic fibrosis carrier    Fetal drug exposure       Vitals:  Vitals:    23 0404   BP: 124/82   Pulse: 85   Resp: 18   Temp: 97.8 °F (36.6 °C)   TempSrc: Oral   SpO2: 100%         NST:   Fetal heart rate baseline: 120, moderate variability, accelerations present, absent decelerations     The tracing has been reviewed and is considered reactive. Assessment/Plan:  Zhane Claudio is a 32 y.o. female  at 39w1d  NST reactive   - The patient will continue with her scheduled office appointments. Next appointment on 3/21/23.       - Signs and Symptoms of  labor precautions were reviewed. - She was counseled on adequate hydration prior to discharge   - The patient was instructed on fetal kick counts. Discussed results with Dr. Avi Jurado who is agreeable to the above plan.      Ian Flores DO  Ob/Gyn Resident   3/20/2023, 4:39 AM

## 2023-03-20 NOTE — ED NOTES
PT ambulated  to bathroom with ease no signs of distress at this time.       Adarsh Espinosa RN  03/20/23 9685

## 2023-03-21 ENCOUNTER — ROUTINE PRENATAL (OUTPATIENT)
Dept: OBGYN CLINIC | Age: 28
End: 2023-03-21
Payer: MEDICAID

## 2023-03-21 VITALS — DIASTOLIC BLOOD PRESSURE: 82 MMHG | BODY MASS INDEX: 47.55 KG/M2 | SYSTOLIC BLOOD PRESSURE: 128 MMHG | WEIGHT: 260 LBS

## 2023-03-21 DIAGNOSIS — O99.340 BIPOLAR DISEASE DURING PREGNANCY, ANTEPARTUM (HCC): ICD-10-CM

## 2023-03-21 DIAGNOSIS — F31.9 BIPOLAR DISEASE DURING PREGNANCY, ANTEPARTUM (HCC): ICD-10-CM

## 2023-03-21 DIAGNOSIS — Z3A.39 39 WEEKS GESTATION OF PREGNANCY: ICD-10-CM

## 2023-03-21 DIAGNOSIS — O09.90 HIGH RISK PREGNANCY, ANTEPARTUM: Primary | ICD-10-CM

## 2023-03-21 DIAGNOSIS — R82.71 GBS BACTERIURIA: ICD-10-CM

## 2023-03-21 LAB
EKG ATRIAL RATE: 101 BPM
EKG P AXIS: 30 DEGREES
EKG P-R INTERVAL: 132 MS
EKG Q-T INTERVAL: 342 MS
EKG QRS DURATION: 82 MS
EKG QTC CALCULATION (BAZETT): 443 MS
EKG R AXIS: 29 DEGREES
EKG T AXIS: 26 DEGREES
EKG VENTRICULAR RATE: 101 BPM

## 2023-03-21 PROCEDURE — G8427 DOCREV CUR MEDS BY ELIG CLIN: HCPCS | Performed by: ADVANCED PRACTICE MIDWIFE

## 2023-03-21 PROCEDURE — 93010 ELECTROCARDIOGRAM REPORT: CPT | Performed by: INTERNAL MEDICINE

## 2023-03-21 PROCEDURE — G8417 CALC BMI ABV UP PARAM F/U: HCPCS | Performed by: ADVANCED PRACTICE MIDWIFE

## 2023-03-21 PROCEDURE — G8482 FLU IMMUNIZE ORDER/ADMIN: HCPCS | Performed by: ADVANCED PRACTICE MIDWIFE

## 2023-03-21 PROCEDURE — 1036F TOBACCO NON-USER: CPT | Performed by: ADVANCED PRACTICE MIDWIFE

## 2023-03-21 PROCEDURE — 99213 OFFICE O/P EST LOW 20 MIN: CPT | Performed by: ADVANCED PRACTICE MIDWIFE

## 2023-03-21 NOTE — PROGRESS NOTES
32.7% STACIE 8.05      2. 39 weeks gestation of pregnancy  - IOL cancelled  - Reviewed risks/benefits/alternatives to induction of labor. Will return for 40 week ultrasound. Questions answered    3. Bipolar disease during pregnancy, antepartum ([address]  - Stable on current treatment plans. Reports is coping well. 4. GBS bacteriuria  - Will need treatment in labor        She was counseled regarding all of the above:    Return in about 1 week (around 3/28/2023) for Return OB, growth ultrasound with ALFRED Ayala.     The patient, Clau Burns,  was seen with a total time spent of 25 minutes for the visit on this date of service by the [facility]  On this date March 21, 2023,  I have spent greater than 50% of this visit:  [x] reviewing previous notes  [x] reviewing test results  [x] pre-charting  Discussed with patient:  [x] Importance of compliance with treatment plan  [x] Counseling  [] Coordinating care  [x] Documenting     Electronically Signed By: HUGO Palacio CNM

## 2023-03-23 ENCOUNTER — ANESTHESIA EVENT (OUTPATIENT)
Dept: LABOR AND DELIVERY | Age: 28
DRG: 542 | End: 2023-03-23
Payer: MEDICAID

## 2023-03-23 ENCOUNTER — ANESTHESIA (OUTPATIENT)
Dept: LABOR AND DELIVERY | Age: 28
DRG: 542 | End: 2023-03-23
Payer: MEDICAID

## 2023-03-23 ENCOUNTER — HOSPITAL ENCOUNTER (INPATIENT)
Age: 28
LOS: 2 days | Discharge: HOME | DRG: 542 | End: 2023-03-25
Attending: OBSTETRICS & GYNECOLOGY | Admitting: SPECIALIST
Payer: MEDICAID

## 2023-03-23 DIAGNOSIS — Z37.9 VACUUM-ASSISTED VAGINAL DELIVERY: Primary | ICD-10-CM

## 2023-03-23 PROBLEM — Z3A.39 39 WEEKS GESTATION OF PREGNANCY: Status: ACTIVE | Noted: 2023-03-23

## 2023-03-23 LAB
ABO/RH: NORMAL
ABSOLUTE EOS #: <0.03 K/UL (ref 0–0.44)
ABSOLUTE IMMATURE GRANULOCYTE: 0.19 K/UL (ref 0–0.3)
ABSOLUTE LYMPH #: 3.19 K/UL (ref 1.1–3.7)
ABSOLUTE MONO #: 1.19 K/UL (ref 0.1–1.2)
AMPHETAMINE SCREEN URINE: NEGATIVE
ANTIBODY SCREEN: NEGATIVE
ARM BAND NUMBER: NORMAL
BARBITURATE SCREEN URINE: NEGATIVE
BASOPHILS # BLD: 0 % (ref 0–2)
BASOPHILS ABSOLUTE: 0.03 K/UL (ref 0–0.2)
BENZODIAZEPINE SCREEN, URINE: NEGATIVE
CANNABINOID SCREEN URINE: NEGATIVE
COCAINE METABOLITE, URINE: NEGATIVE
EOSINOPHILS RELATIVE PERCENT: 0 % (ref 1–4)
EXPIRATION DATE: NORMAL
FENTANYL URINE: NEGATIVE
HCT VFR BLD AUTO: 38 % (ref 36.3–47.1)
HGB BLD-MCNC: 12.4 G/DL (ref 11.9–15.1)
IMMATURE GRANULOCYTES: 1 %
LYMPHOCYTES # BLD: 18 % (ref 24–43)
MCH RBC QN AUTO: 30.4 PG (ref 25.2–33.5)
MCHC RBC AUTO-ENTMCNC: 32.6 G/DL (ref 28.4–34.8)
MCV RBC AUTO: 93.1 FL (ref 82.6–102.9)
METHADONE SCREEN, URINE: NEGATIVE
MONOCYTES # BLD: 7 % (ref 3–12)
NRBC AUTOMATED: 0 PER 100 WBC
OPIATES, URINE: NEGATIVE
OXYCODONE SCREEN URINE: NEGATIVE
PDW BLD-RTO: 14 % (ref 11.8–14.4)
PHENCYCLIDINE, URINE: NEGATIVE
PLATELET # BLD AUTO: 394 K/UL (ref 138–453)
PMV BLD AUTO: 9.8 FL (ref 8.1–13.5)
RBC # BLD: 4.08 M/UL (ref 3.95–5.11)
SEG NEUTROPHILS: 74 % (ref 36–65)
SEGMENTED NEUTROPHILS ABSOLUTE COUNT: 13.52 K/UL (ref 1.5–8.1)
T PALLIDUM AB SER QL IA: NONREACTIVE
TEST INFORMATION: NORMAL
WBC # BLD AUTO: 18.1 K/UL (ref 3.5–11.3)

## 2023-03-23 PROCEDURE — 87086 URINE CULTURE/COLONY COUNT: CPT

## 2023-03-23 PROCEDURE — 86780 TREPONEMA PALLIDUM: CPT

## 2023-03-23 PROCEDURE — 80307 DRUG TEST PRSMV CHEM ANLYZR: CPT

## 2023-03-23 PROCEDURE — 6360000002 HC RX W HCPCS

## 2023-03-23 PROCEDURE — 86901 BLOOD TYPING SEROLOGIC RH(D): CPT

## 2023-03-23 PROCEDURE — 86900 BLOOD TYPING SEROLOGIC ABO: CPT

## 2023-03-23 PROCEDURE — 85025 COMPLETE CBC W/AUTO DIFF WBC: CPT

## 2023-03-23 PROCEDURE — 2580000003 HC RX 258

## 2023-03-23 PROCEDURE — 1220000000 HC SEMI PRIVATE OB R&B

## 2023-03-23 PROCEDURE — 6370000000 HC RX 637 (ALT 250 FOR IP)

## 2023-03-23 PROCEDURE — 86850 RBC ANTIBODY SCREEN: CPT

## 2023-03-23 PROCEDURE — 6360000002 HC RX W HCPCS: Performed by: STUDENT IN AN ORGANIZED HEALTH CARE EDUCATION/TRAINING PROGRAM

## 2023-03-23 RX ORDER — DIPHENHYDRAMINE HYDROCHLORIDE 50 MG/ML
25 INJECTION INTRAMUSCULAR; INTRAVENOUS ONCE
Status: COMPLETED | OUTPATIENT
Start: 2023-03-23 | End: 2023-03-23

## 2023-03-23 RX ORDER — PROCHLORPERAZINE EDISYLATE 5 MG/ML
10 INJECTION INTRAMUSCULAR; INTRAVENOUS ONCE
Status: COMPLETED | OUTPATIENT
Start: 2023-03-23 | End: 2023-03-23

## 2023-03-23 RX ORDER — SODIUM CHLORIDE, SODIUM LACTATE, POTASSIUM CHLORIDE, AND CALCIUM CHLORIDE .6; .31; .03; .02 G/100ML; G/100ML; G/100ML; G/100ML
1000 INJECTION, SOLUTION INTRAVENOUS PRN
Status: DISCONTINUED | OUTPATIENT
Start: 2023-03-23 | End: 2023-03-24

## 2023-03-23 RX ORDER — BUSPIRONE HYDROCHLORIDE 10 MG/1
20 TABLET ORAL NIGHTLY
Status: DISCONTINUED | OUTPATIENT
Start: 2023-03-23 | End: 2023-03-25 | Stop reason: HOSPADM

## 2023-03-23 RX ORDER — ROPIVACAINE HYDROCHLORIDE 2 MG/ML
INJECTION, SOLUTION EPIDURAL; INFILTRATION; PERINEURAL
Status: COMPLETED
Start: 2023-03-23 | End: 2023-03-24

## 2023-03-23 RX ORDER — ONDANSETRON 2 MG/ML
4 INJECTION INTRAMUSCULAR; INTRAVENOUS EVERY 6 HOURS PRN
Status: DISCONTINUED | OUTPATIENT
Start: 2023-03-23 | End: 2023-03-24

## 2023-03-23 RX ORDER — SODIUM CHLORIDE, SODIUM LACTATE, POTASSIUM CHLORIDE, AND CALCIUM CHLORIDE .6; .31; .03; .02 G/100ML; G/100ML; G/100ML; G/100ML
500 INJECTION, SOLUTION INTRAVENOUS PRN
Status: DISCONTINUED | OUTPATIENT
Start: 2023-03-23 | End: 2023-03-24

## 2023-03-23 RX ORDER — SEVOFLURANE 250 ML/250ML
1 LIQUID RESPIRATORY (INHALATION) CONTINUOUS PRN
Status: DISCONTINUED | OUTPATIENT
Start: 2023-03-23 | End: 2023-03-24

## 2023-03-23 RX ORDER — SODIUM CHLORIDE 0.9 % (FLUSH) 0.9 %
5-40 SYRINGE (ML) INJECTION PRN
Status: DISCONTINUED | OUTPATIENT
Start: 2023-03-23 | End: 2023-03-24

## 2023-03-23 RX ORDER — SODIUM CHLORIDE 9 MG/ML
25 INJECTION, SOLUTION INTRAVENOUS PRN
Status: DISCONTINUED | OUTPATIENT
Start: 2023-03-23 | End: 2023-03-24

## 2023-03-23 RX ORDER — MORPHINE SULFATE 10 MG/ML
10 INJECTION, SOLUTION INTRAMUSCULAR; INTRAVENOUS ONCE
Status: COMPLETED | OUTPATIENT
Start: 2023-03-23 | End: 2023-03-23

## 2023-03-23 RX ORDER — ACETAMINOPHEN 500 MG
1000 TABLET ORAL EVERY 6 HOURS PRN
Status: DISCONTINUED | OUTPATIENT
Start: 2023-03-23 | End: 2023-03-24

## 2023-03-23 RX ORDER — BUSPIRONE HYDROCHLORIDE 10 MG/1
20 TABLET ORAL DAILY
Status: DISCONTINUED | OUTPATIENT
Start: 2023-03-23 | End: 2023-03-23

## 2023-03-23 RX ORDER — SODIUM CHLORIDE 0.9 % (FLUSH) 0.9 %
5-40 SYRINGE (ML) INJECTION EVERY 12 HOURS SCHEDULED
Status: DISCONTINUED | OUTPATIENT
Start: 2023-03-23 | End: 2023-03-24

## 2023-03-23 RX ORDER — SODIUM CHLORIDE, SODIUM LACTATE, POTASSIUM CHLORIDE, CALCIUM CHLORIDE 600; 310; 30; 20 MG/100ML; MG/100ML; MG/100ML; MG/100ML
INJECTION, SOLUTION INTRAVENOUS CONTINUOUS
Status: DISCONTINUED | OUTPATIENT
Start: 2023-03-23 | End: 2023-03-24

## 2023-03-23 RX ADMIN — PROCHLORPERAZINE EDISYLATE 10 MG: 5 INJECTION INTRAMUSCULAR; INTRAVENOUS at 16:55

## 2023-03-23 RX ADMIN — Medication 25 MCG: at 17:00

## 2023-03-23 RX ADMIN — SERTRALINE 75 MG: 50 TABLET, FILM COATED ORAL at 21:33

## 2023-03-23 RX ADMIN — BREXPIPRAZOLE 0.5 MG: 0.25 TABLET ORAL at 21:33

## 2023-03-23 RX ADMIN — MORPHINE SULFATE 10 MG: 10 INJECTION INTRAVENOUS at 16:54

## 2023-03-23 RX ADMIN — MORPHINE SULFATE 10 MG: 10 INJECTION INTRAVENOUS at 21:59

## 2023-03-23 RX ADMIN — PROCHLORPERAZINE EDISYLATE 10 MG: 5 INJECTION INTRAMUSCULAR; INTRAVENOUS at 22:00

## 2023-03-23 RX ADMIN — DIPHENHYDRAMINE HYDROCHLORIDE 25 MG: 50 INJECTION, SOLUTION INTRAMUSCULAR; INTRAVENOUS at 22:03

## 2023-03-23 RX ADMIN — Medication 2.5 MILLION UNITS: at 20:03

## 2023-03-23 RX ADMIN — SODIUM CHLORIDE, POTASSIUM CHLORIDE, SODIUM LACTATE AND CALCIUM CHLORIDE 1000 ML: 600; 310; 30; 20 INJECTION, SOLUTION INTRAVENOUS at 23:37

## 2023-03-23 RX ADMIN — SODIUM CHLORIDE 5 MILLION UNITS: 900 INJECTION INTRAVENOUS at 16:27

## 2023-03-23 RX ADMIN — BUSPIRONE HYDROCHLORIDE 20 MG: 10 TABLET ORAL at 21:33

## 2023-03-23 RX ADMIN — SODIUM CHLORIDE, POTASSIUM CHLORIDE, SODIUM LACTATE AND CALCIUM CHLORIDE: 600; 310; 30; 20 INJECTION, SOLUTION INTRAVENOUS at 16:27

## 2023-03-23 RX ADMIN — ACETAMINOPHEN 1000 MG: 500 TABLET ORAL at 21:58

## 2023-03-23 ASSESSMENT — PAIN DESCRIPTION - LOCATION: LOCATION: ABDOMEN

## 2023-03-23 ASSESSMENT — PAIN SCALES - GENERAL
PAINLEVEL_OUTOF10: 7
PAINLEVEL_OUTOF10: 5

## 2023-03-23 ASSESSMENT — PAIN DESCRIPTION - ORIENTATION: ORIENTATION: LOWER

## 2023-03-23 NOTE — DISCHARGE SUMMARY
Obstetric Discharge Summary  [facility]    Patient Name: Nghia Hartley  Patient : 1995  Primary Care Physician: Janell Dill  Admit Date: 3/23/2023    Principal Diagnosis: IUP at 39w4d, admitted for RR IOL     Her pregnancy has been complicated by:   Patient Active Problem List   Diagnosis    Depression    Anxiety    Bipolar    Elevated BP x1    THC Use    Cystic fibrosis carrier    VAVD w/ third degree 3/24/23 F Apg 7/9 Wt 8#1        Infection Present?: No  Hospital Acquired: No    Surgical Operations & Procedures:  Analgesia: epidural  Delivery Type: Spontaneous Vaginal Delivery: Vacuum Extraction   Laceration(s): Third degree laceration and left sulcal laceration Suture used for repair:  Vicryl 3.0. Consultations: None     Pertinent Findings & Procedures:   Nghia Hartley is a 32 y.o. female  at 39w4d admitted for RR IOL; received Pen G, morphine/compazine x1, nitrous oxide, Cytotec 25 PV x1, martinez balloon, epidural, pitocin, SROM meconium stained. She delivered by vacuum extraction  a Live Born infant on 3/24/23. Information for the patient's :  Glorya Roses Girl Santosh Begum [[id number]]   female   Birth Weight: 8 lb 1.8 oz (3.68 kg)     Apgars: 7 at 1 minute and 9 at 5 minutes. Postpartum course: normal.      Course of patient: uncomplicated    Discharge to: Home    Readmission planned: no     Recommendations on Discharge:     Medications:      Medication List        START taking these medications      ibuprofen 600 MG tablet  Commonly known as: ADVIL;MOTRIN  Take 1 tablet by mouth every 6 hours as needed for Pain     magnesium hydroxide 400 MG/5ML suspension  Commonly known as: Milk of Magnesia  Take 30 mLs by mouth daily as needed for Constipation     oxyCODONE 5 MG immediate release tablet  Commonly known as: Roxicodone  Take 1 tablet by mouth every 6 hours as needed for Pain for up to 3 days. Intended supply: 3 days.  Take lowest dose possible to manage pain Max Daily Amount: 20 mg     sennosides-docusate sodium 8.6-50 MG tablet  Commonly known as: SENOKOT-S  Take 2 tablets by mouth in the morning and at bedtime            CONTINUE taking these medications      busPIRone 10 MG tablet  Commonly known as: BUSPAR     PRENATAL VITAMINS PO     Rexulti 0.5 MG Tabs tablet  Generic drug: brexpiprazole     * sertraline 25 MG tablet  Commonly known as: ZOLOFT     * sertraline 50 MG tablet  Commonly known as: ZOLOFT           * This list has 2 medication(s) that are the same as other medications prescribed for you. Read the directions carefully, and ask your doctor or other care provider to review them with you. ASK your doctor about these medications      fluconazole 150 MG tablet  Commonly known as: Diflucan  Take 1 tablet by mouth every 72 hours as needed (yeast infection)     nystatin 448530 UNIT/GM powder  Commonly known as: MYCOSTATIN  Apply 3 times daily as needed externally     ondansetron 4 MG tablet  Commonly known as: ZOFRAN  Take 1 tablet by mouth 3 times daily as needed for Nausea or Vomiting               Where to Get Your Medications        These medications were sent to [address] [place] [address]  [address], [address] [address]      Phone: [phone]   oxyCODONE 5 MG immediate release tablet       These medications were sent to [facility] [address] [facility], [address] [place] [address]  [address] [facility], [address] [address]      Phone: [phone]   ibuprofen 600 MG tablet  magnesium hydroxide 400 MG/5ML suspension  sennosides-docusate sodium 8.6-50 MG tablet           Activity: pelvic rest x 6 weeks, no lifting greater than 15 lbs  Diet: regular diet  Follow up: 1 week for  PP apt     Condition on discharge: stable    Discharge date: 3/25/23    Mohsen Torres DO  Ob/Gyn Resident    Comments:  Home care and follow-up care were reviewed.   Pelvic

## 2023-03-23 NOTE — H&P
OBSTETRICAL HISTORY [facility]    Date: 3/23/2023       Time: 1:41 PM   Patient Name: Eliana Martell     Patient : 1995  Room/Bed: 3887/7825-28    Admission Date/Time: 3/23/2023  1:34 PM    CC: Contractions and leakage of fluid     HPI: Eliana Martell is a 32 y.o.  at 39w4d who presents to L&D with contractions. She states that they started last night and worsened today. She also complained of leakage of fluid while in the bathroom of L&D. She states that she leaves an hour away and does not want to go home if she does not make any cervical change and feels too uncomfortable to leave the hospital. Patient denies any fever, chills, N/V, headaches, vision changes, chest pain, shortness of breath, RUQ pain,and increased swelling/tenderness in bilateral lower extremities. Patient denies any vaginal discharge and any urinary complaints. The patient reports fetal movement is present, complains of contractions, complains of loss of fluid, denies vaginal bleeding. DATING:  LMP: Patient's last menstrual period was 2022.   Estimated Date of Delivery: 3/26/23   Based on: early ultrasound, at 7 3/7 weeks GA    PREGNANCY RISK FACTORS:  Patient Active Problem List   Diagnosis    Depression    Anxiety    Dysmenorrhea    High risk pregnancy, antepartum    Bipolar disease during pregnancy, antepartum ([address]    Obesity affecting pregnancy, antepartum    HRP (high risk pregnancy), second trimester    Elevated BP x1    THC Use    GBS bacteriuria    Cystic fibrosis carrier    Fetal drug exposure      Steroids Given In This Pregnancy:  no     REVIEW OF SYSTEMS:  Constitutional: negative fever, negative chills  HEENT: negative visual disturbances, negative headaches  Respiratory: negative dyspnea, negative cough  Cardiovascular: negative chest pain,  negative palpitations  Gastrointestinal: positive abdominal pain, negative RUQ pain, negative N/V, negative diarrhea, negative contractions since yesterday, she states that they are worsening. She felt a gush of fluid while walking to the bathroom and thinks that her may have ruptured her membranes   - cEFM/TOCO - Cat 1 tracing, no contractions   - VSS, afebrile   - SSE: negative pooling and fluid with Valsalva   - SVE: 1-2/50/-2 with recheck in 2 hours which was unchanged   - Negative ferning and Nitrazine, MVP >2x2 pocket of fluid   - Discussed with patient no cervical change in 2 hours. She was also checked in the office on 3/21/23 and is again unchanged. Patient states that she lives an hour away and she does not want to go home. She is requesting to remain at the hospital for a risk reducing induction of labor. She is aware that her primary OB provider Dr. Rafa Kuhn is not on call until tomorrow at 7 AM and she still requests to remain for an induction. As a result, will plan to admit to labor and delivery under the service of Dr. Chrissy Cano   - CBC, T&S, T.Pal ordered   - UDS ordered. R/B/A discussed with patient and patient agreeable   - IVF: LR @ 125 cc/hr   - Plan of Induction: Cytotec 25 PV x1   - Patient also requesting morphine/compazine for abdominal pain. UDS collected prior to medication being given   - Low suspicion for ROM as nitrazine, ferning, pooling negative with normal STACIE and MVP    Elevated BP x1   - Noted on 3/20/23.  BP normotensive on admission  - Continue to monitor closely during admission      CF carrier   - Found on carrier screening during this pregnancy  - FOB neg      Low lying placenta (rslvd)   - Seen at anatomy scan, now resolved      Anxiety/Depression/BPD   - Mood appropriate on exam  - Currently stable on the following medications: Zoloft 75mg, Rexulti 0.5mg and Buspar 20 mg qHS   - Continue to monitor closely     Fetal drug exposure   - Fetal echo wnl      THC Use   - UDS neg on admission  - SW consulted PP      BMI 47  Patient Active Problem List    Diagnosis Date Noted    THC Use 03/20/2023     Priority:

## 2023-03-24 PROBLEM — Z3A.39 39 WEEKS GESTATION OF PREGNANCY: Status: RESOLVED | Noted: 2023-03-23 | Resolved: 2023-03-24

## 2023-03-24 PROBLEM — R82.71 GBS BACTERIURIA: Status: RESOLVED | Noted: 2023-03-20 | Resolved: 2023-03-24

## 2023-03-24 PROBLEM — O99.210 OBESITY AFFECTING PREGNANCY, ANTEPARTUM: Status: RESOLVED | Noted: 2022-08-11 | Resolved: 2023-03-24

## 2023-03-24 PROBLEM — O09.92 HRP (HIGH RISK PREGNANCY), SECOND TRIMESTER: Status: RESOLVED | Noted: 2022-12-29 | Resolved: 2023-03-24

## 2023-03-24 PROBLEM — Z37.9 VACUUM-ASSISTED VAGINAL DELIVERY: Status: ACTIVE | Noted: 2023-03-24

## 2023-03-24 PROBLEM — O09.90 HIGH RISK PREGNANCY, ANTEPARTUM: Status: RESOLVED | Noted: 2022-08-11 | Resolved: 2023-03-24

## 2023-03-24 LAB
MICROORGANISM SPEC CULT: NO GROWTH
SPECIMEN DESCRIPTION: NORMAL

## 2023-03-24 PROCEDURE — 2580000003 HC RX 258

## 2023-03-24 PROCEDURE — 6360000002 HC RX W HCPCS: Performed by: NURSE ANESTHETIST, CERTIFIED REGISTERED

## 2023-03-24 PROCEDURE — 6360000002 HC RX W HCPCS

## 2023-03-24 PROCEDURE — 7200000001 HC VAGINAL DELIVERY

## 2023-03-24 PROCEDURE — 3E0P7VZ INTRODUCTION OF HORMONE INTO FEMALE REPRODUCTIVE, VIA NATURAL OR ARTIFICIAL OPENING: ICD-10-PCS | Performed by: OBSTETRICS & GYNECOLOGY

## 2023-03-24 PROCEDURE — 6370000000 HC RX 637 (ALT 250 FOR IP): Performed by: STUDENT IN AN ORGANIZED HEALTH CARE EDUCATION/TRAINING PROGRAM

## 2023-03-24 PROCEDURE — 6360000002 HC RX W HCPCS: Performed by: ANESTHESIOLOGY

## 2023-03-24 PROCEDURE — 6360000002 HC RX W HCPCS: Performed by: STUDENT IN AN ORGANIZED HEALTH CARE EDUCATION/TRAINING PROGRAM

## 2023-03-24 PROCEDURE — 1220000000 HC SEMI PRIVATE OB R&B

## 2023-03-24 PROCEDURE — 2500000003 HC RX 250 WO HCPCS

## 2023-03-24 PROCEDURE — 59409 OBSTETRICAL CARE: CPT | Performed by: OBSTETRICS & GYNECOLOGY

## 2023-03-24 PROCEDURE — 0DQR0ZZ REPAIR ANAL SPHINCTER, OPEN APPROACH: ICD-10-PCS | Performed by: OBSTETRICS & GYNECOLOGY

## 2023-03-24 PROCEDURE — 2580000003 HC RX 258: Performed by: STUDENT IN AN ORGANIZED HEALTH CARE EDUCATION/TRAINING PROGRAM

## 2023-03-24 PROCEDURE — 3700000025 EPIDURAL BLOCK: Performed by: ANESTHESIOLOGY

## 2023-03-24 PROCEDURE — 2500000003 HC RX 250 WO HCPCS: Performed by: NURSE ANESTHETIST, CERTIFIED REGISTERED

## 2023-03-24 PROCEDURE — 51701 INSERT BLADDER CATHETER: CPT

## 2023-03-24 RX ORDER — LANOLIN 72 %
OINTMENT (GRAM) TOPICAL PRN
Status: DISCONTINUED | OUTPATIENT
Start: 2023-03-24 | End: 2023-03-25 | Stop reason: HOSPADM

## 2023-03-24 RX ORDER — IBUPROFEN 600 MG/1
600 TABLET ORAL EVERY 6 HOURS PRN
Status: DISCONTINUED | OUTPATIENT
Start: 2023-03-24 | End: 2023-03-25 | Stop reason: HOSPADM

## 2023-03-24 RX ORDER — SENNA AND DOCUSATE SODIUM 50; 8.6 MG/1; MG/1
2 TABLET, FILM COATED ORAL DAILY PRN
Status: DISCONTINUED | OUTPATIENT
Start: 2023-03-24 | End: 2023-03-25 | Stop reason: HOSPADM

## 2023-03-24 RX ORDER — LIDOCAINE HYDROCHLORIDE 10 MG/ML
INJECTION, SOLUTION INFILTRATION; PERINEURAL
Status: DISCONTINUED
Start: 2023-03-24 | End: 2023-03-24

## 2023-03-24 RX ORDER — SIMETHICONE 80 MG
80 TABLET,CHEWABLE ORAL EVERY 6 HOURS PRN
Status: DISCONTINUED | OUTPATIENT
Start: 2023-03-24 | End: 2023-03-25 | Stop reason: HOSPADM

## 2023-03-24 RX ORDER — IBUPROFEN 600 MG/1
600 TABLET ORAL EVERY 6 HOURS PRN
Qty: 30 TABLET | Refills: 1 | Status: SHIPPED | OUTPATIENT
Start: 2023-03-24

## 2023-03-24 RX ORDER — SODIUM CHLORIDE, SODIUM LACTATE, POTASSIUM CHLORIDE, CALCIUM CHLORIDE 600; 310; 30; 20 MG/100ML; MG/100ML; MG/100ML; MG/100ML
INJECTION, SOLUTION INTRAVENOUS CONTINUOUS
Status: DISCONTINUED | OUTPATIENT
Start: 2023-03-24 | End: 2023-03-25 | Stop reason: HOSPADM

## 2023-03-24 RX ORDER — NALOXONE HYDROCHLORIDE 0.4 MG/ML
INJECTION, SOLUTION INTRAMUSCULAR; INTRAVENOUS; SUBCUTANEOUS PRN
Status: DISCONTINUED | OUTPATIENT
Start: 2023-03-24 | End: 2023-03-24

## 2023-03-24 RX ORDER — SODIUM CHLORIDE 0.9 % (FLUSH) 0.9 %
5-40 SYRINGE (ML) INJECTION EVERY 12 HOURS SCHEDULED
Status: DISCONTINUED | OUTPATIENT
Start: 2023-03-24 | End: 2023-03-25 | Stop reason: HOSPADM

## 2023-03-24 RX ORDER — ACETAMINOPHEN 500 MG
1000 TABLET ORAL EVERY 6 HOURS PRN
Status: DISCONTINUED | OUTPATIENT
Start: 2023-03-24 | End: 2023-03-25 | Stop reason: HOSPADM

## 2023-03-24 RX ORDER — SENNA AND DOCUSATE SODIUM 50; 8.6 MG/1; MG/1
2 TABLET, FILM COATED ORAL 2 TIMES DAILY
Qty: 120 TABLET | Refills: 2 | Status: SHIPPED | OUTPATIENT
Start: 2023-03-24 | End: 2023-04-23

## 2023-03-24 RX ORDER — ROPIVACAINE HYDROCHLORIDE 2 MG/ML
INJECTION, SOLUTION EPIDURAL; INFILTRATION; PERINEURAL CONTINUOUS PRN
Status: DISCONTINUED | OUTPATIENT
Start: 2023-03-24 | End: 2023-03-24 | Stop reason: SDUPTHER

## 2023-03-24 RX ORDER — HYDROCORTISONE 25 MG/G
CREAM TOPICAL
Status: DISCONTINUED | OUTPATIENT
Start: 2023-03-24 | End: 2023-03-25 | Stop reason: HOSPADM

## 2023-03-24 RX ORDER — ONDANSETRON 2 MG/ML
4 INJECTION INTRAMUSCULAR; INTRAVENOUS EVERY 6 HOURS PRN
Status: DISCONTINUED | OUTPATIENT
Start: 2023-03-24 | End: 2023-03-24

## 2023-03-24 RX ORDER — ONDANSETRON 2 MG/ML
4 INJECTION INTRAMUSCULAR; INTRAVENOUS EVERY 4 HOURS PRN
Status: DISCONTINUED | OUTPATIENT
Start: 2023-03-24 | End: 2023-03-25 | Stop reason: HOSPADM

## 2023-03-24 RX ORDER — BISACODYL 10 MG
10 SUPPOSITORY, RECTAL RECTAL DAILY PRN
Status: DISCONTINUED | OUTPATIENT
Start: 2023-03-24 | End: 2023-03-25 | Stop reason: HOSPADM

## 2023-03-24 RX ORDER — LIDOCAINE HYDROCHLORIDE AND EPINEPHRINE 15; 5 MG/ML; UG/ML
INJECTION, SOLUTION EPIDURAL PRN
Status: DISCONTINUED | OUTPATIENT
Start: 2023-03-24 | End: 2023-03-24 | Stop reason: SDUPTHER

## 2023-03-24 RX ORDER — SODIUM CHLORIDE 9 MG/ML
INJECTION, SOLUTION INTRAVENOUS PRN
Status: DISCONTINUED | OUTPATIENT
Start: 2023-03-24 | End: 2023-03-25 | Stop reason: HOSPADM

## 2023-03-24 RX ORDER — ROPIVACAINE HYDROCHLORIDE 2 MG/ML
INJECTION, SOLUTION EPIDURAL; INFILTRATION; PERINEURAL PRN
Status: DISCONTINUED | OUTPATIENT
Start: 2023-03-24 | End: 2023-03-24 | Stop reason: SDUPTHER

## 2023-03-24 RX ORDER — SODIUM CHLORIDE 0.9 % (FLUSH) 0.9 %
5-40 SYRINGE (ML) INJECTION PRN
Status: DISCONTINUED | OUTPATIENT
Start: 2023-03-24 | End: 2023-03-25 | Stop reason: HOSPADM

## 2023-03-24 RX ORDER — KETOROLAC TROMETHAMINE 30 MG/ML
INJECTION, SOLUTION INTRAMUSCULAR; INTRAVENOUS
Status: COMPLETED
Start: 2023-03-24 | End: 2023-03-24

## 2023-03-24 RX ADMIN — Medication 2.5 MILLION UNITS: at 05:32

## 2023-03-24 RX ADMIN — SERTRALINE 75 MG: 50 TABLET, FILM COATED ORAL at 22:02

## 2023-03-24 RX ADMIN — ONDANSETRON 4 MG: 2 INJECTION INTRAMUSCULAR; INTRAVENOUS at 09:00

## 2023-03-24 RX ADMIN — SODIUM CHLORIDE, PRESERVATIVE FREE 10 ML: 5 INJECTION INTRAVENOUS at 22:02

## 2023-03-24 RX ADMIN — KETOROLAC TROMETHAMINE: 30 INJECTION, SOLUTION INTRAMUSCULAR; INTRAVENOUS at 09:45

## 2023-03-24 RX ADMIN — ROPIVACAINE HYDROCHLORIDE 10 ML/HR: 2 INJECTION, SOLUTION EPIDURAL; INFILTRATION at 06:24

## 2023-03-24 RX ADMIN — Medication 166.7 ML: at 09:25

## 2023-03-24 RX ADMIN — LIDOCAINE HYDROCHLORIDE: 10 INJECTION, SOLUTION INFILTRATION; PERINEURAL at 09:40

## 2023-03-24 RX ADMIN — Medication 2000 MG: at 10:00

## 2023-03-24 RX ADMIN — ROPIVACAINE HYDROCHLORIDE 10 ML/HR: 2 INJECTION, SOLUTION EPIDURAL; INFILTRATION; PERINEURAL at 00:32

## 2023-03-24 RX ADMIN — SODIUM CHLORIDE, POTASSIUM CHLORIDE, SODIUM LACTATE AND CALCIUM CHLORIDE: 600; 310; 30; 20 INJECTION, SOLUTION INTRAVENOUS at 09:25

## 2023-03-24 RX ADMIN — ROPIVACAINE HYDROCHLORIDE 10 ML/HR: 2 INJECTION, SOLUTION EPIDURAL; INFILTRATION at 01:16

## 2023-03-24 RX ADMIN — LIDOCAINE HYDROCHLORIDE,EPINEPHRINE BITARTRATE 3 ML: 15; .005 INJECTION, SOLUTION EPIDURAL; INFILTRATION; INTRACAUDAL; PERINEURAL at 00:28

## 2023-03-24 RX ADMIN — Medication 1 MILLI-UNITS/MIN: at 01:58

## 2023-03-24 RX ADMIN — IBUPROFEN 600 MG: 600 TABLET, FILM COATED ORAL at 18:12

## 2023-03-24 RX ADMIN — ACETAMINOPHEN 1000 MG: 500 TABLET ORAL at 18:12

## 2023-03-24 RX ADMIN — BUSPIRONE HYDROCHLORIDE 20 MG: 10 TABLET ORAL at 22:02

## 2023-03-24 RX ADMIN — Medication 2.5 MILLION UNITS: at 00:45

## 2023-03-24 RX ADMIN — SODIUM CHLORIDE, POTASSIUM CHLORIDE, SODIUM LACTATE AND CALCIUM CHLORIDE: 600; 310; 30; 20 INJECTION, SOLUTION INTRAVENOUS at 00:42

## 2023-03-24 RX ADMIN — Medication 8 ML: at 00:32

## 2023-03-24 RX ADMIN — BREXPIPRAZOLE 0.5 MG: 0.25 TABLET ORAL at 22:02

## 2023-03-24 ASSESSMENT — PAIN DESCRIPTION - LOCATION: LOCATION: ABDOMEN

## 2023-03-24 ASSESSMENT — PAIN SCALES - GENERAL
PAINLEVEL_OUTOF10: 7
PAINLEVEL_OUTOF10: 6

## 2023-03-24 ASSESSMENT — PAIN DESCRIPTION - DESCRIPTORS: DESCRIPTORS: CRAMPING

## 2023-03-24 NOTE — CARE COORDINATION
Social Work     Sw reviewed medical record (current active problem list) and tox screens and found no current concerns. Mom was + THC at initial pnc appt 8/11/22, mom is negative at delivery. Sw spoke with mom briefly to explain Sw role, inquire if any needs or concerns, and provide safe sleep education and discuss. Mom denied any needs or questions and informs baby has a safe sleep environment (crib). Mom denied any current s/s of anxiety or depression and is aware to reach out to OB if any s/s occur after dc. Mom reports a good support system and denied any current questions or needs. Mom reports this is her 1st child and states she and fob reside together. Mom states ped not yet chosen, she will need list.     Due to [address] was contacted and intake made (Adriana). No current concerns, baby is cleared to dc home with mom. Sw encouraged mom to reach out if any issues or concerns arise.

## 2023-03-24 NOTE — FLOWSHEET NOTE
Self-administered nitrous oxide discontinued at 2130  due to pt. Request. Pt. Stated that nitrous is not working for pain. Pt assessment, VS, and FHT's as charted.

## 2023-03-24 NOTE — ANESTHESIA PRE PROCEDURE
Applicable)     EKG (If Applicable)           Procedure: * No procedures listed *    Medications prior to admission:   Prior to Admission medications    Medication Sig Start Date End Date Taking?  Authorizing Provider   fluconazole (DIFLUCAN) 150 MG tablet Take 1 tablet by mouth every 72 hours as needed (yeast infection)  Patient not taking: Reported on 3/23/2023 3/9/23   Sharlene Green DO   nystatin (MYCOSTATIN) 660603 UNIT/GM powder Apply 3 times daily as needed externally  Patient not taking: Reported on 3/23/2023 3/9/23   Alexy Green DO   ondansetron (ZOFRAN) 4 MG tablet Take 1 tablet by mouth 3 times daily as needed for Nausea or Vomiting  Patient not taking: Reported on 3/23/2023 2/3/23   HUGO Traore CNM   sertraline (ZOLOFT) 25 MG tablet take 1 tablet by mouth once daily 12/10/22   Historical Provider, MD   sertraline (ZOLOFT) 50 MG tablet Taking 75 mg once a day 12/10/22   Historical Provider, MD   Prenatal Vit-Fe Fumarate-FA (PRENATAL VITAMINS PO) Take 1 tablet by mouth daily    Historical Provider, MD   REXULTI 0.5 MG TABS tablet  8/1/22   Historical Provider, MD   busPIRone (BUSPAR) 10 MG tablet Take 20 mg by mouth daily    Historical Provider, MD       Current medications:    Current Facility-Administered Medications   Medication Dose Route Frequency Provider Last Rate Last Admin    lactated ringers IV soln infusion   IntraVENous Continuous Chitra Rizvi MD   Stopped at 03/23/23 1627    lactated ringers bolus  500 mL IntraVENous PRN Chitra Rizvi MD        Or    lactated ringers bolus  1,000 mL IntraVENous PRN Chitra Rizvi MD 2,000 mL/hr at 03/23/23 2337 1,000 mL at 03/23/23 2337    sodium chloride flush 0.9 % injection 5-40 mL  5-40 mL IntraVENous 2 times per day Chitra Rizvi MD        sodium chloride flush 0.9 % injection 5-40 mL  5-40 mL IntraVENous PRN Chitra Rizvi MD        0.9 % sodium chloride infusion  25 mL IntraVENous PRN Chitra Rizvi MD        ondansetron (Malachy Sands) COLPOSCOPY         Social History:    Social History     Tobacco Use    Smoking status: Never    Smokeless tobacco: Former     Types: Chew    Tobacco comments:     Chewing tobacco   Substance Use Topics    Alcohol use: Not Currently                                Counseling given: Not Answered  Tobacco comments: Chewing tobacco      Vital Signs (Current):   Vitals:    03/23/23 1351 03/23/23 1812 03/23/23 2012   BP: 111/77 108/69 117/84   Pulse: (!) 102 89 71   Resp: 18 16 16   Temp: 36.7 °C (98 °F) 36.7 °C (98.1 °F) 36.8 °C (98.3 °F)   TempSrc: Oral Oral Oral   SpO2: 98%  97%                                              BP Readings from Last 3 Encounters:   03/23/23 117/84   03/21/23 128/82   03/20/23 124/82       NPO Status:                                                                                 BMI:   Wt Readings from Last 3 Encounters:   03/21/23 260 lb (117.9 kg)   03/16/23 259 lb (117.5 kg)   03/09/23 257 lb (116.6 kg)     There is no height or weight on file to calculate BMI.    CBC:   Lab Results   Component Value Date/Time    WBC 18.1 03/23/2023 04:28 PM    RBC 4.08 03/23/2023 04:28 PM    HGB 12.4 03/23/2023 04:28 PM    HCT 38.0 03/23/2023 04:28 PM    MCV 93.1 03/23/2023 04:28 PM    RDW 14.0 03/23/2023 04:28 PM     03/23/2023 04:28 PM       CMP:   Lab Results   Component Value Date/Time     03/20/2023 12:43 AM    K 3.9 03/20/2023 12:43 AM     03/20/2023 12:43 AM    CO2 17 03/20/2023 12:43 AM    BUN 8 03/20/2023 12:43 AM    CREATININE 0.39 03/20/2023 12:43 AM    GFRAA >60 08/20/2022 11:39 AM    LABGLOM >60 03/20/2023 12:43 AM    GLUCOSE 88 03/20/2023 12:43 AM    PROT 6.3 03/20/2023 12:43 AM    CALCIUM 9.2 03/20/2023 12:43 AM    BILITOT 0.2 03/20/2023 12:43 AM    ALKPHOS 122 03/20/2023 12:43 AM    AST 14 03/20/2023 12:43 AM    ALT 10 03/20/2023 12:43 AM       POC Tests: No results for input(s): POCGLU, POCNA, POCK, POCCL, POCBUN, POCHEMO, POCHCT in the last 72 hours.     Coags: No results found for: PROTIME, INR, APTT    HCG (If Applicable):   Lab Results   Component Value Date    PREGTESTUR NEGATIVE 05/08/2021    HCGQUANT 103,592 (H) 08/20/2022        ABGs: No results found for: PHART, PO2ART, EBU7MER, ZQT8BOX, BEART, D5XZEHUZ     Type & Screen (If Applicable):  No results found for: LABABO, LABRH    Drug/Infectious Status (If Applicable):  Lab Results   Component Value Date/Time    HEPCAB NONREACTIVE 09/08/2022 11:41 AM       COVID-19 Screening (If Applicable):   Lab Results   Component Value Date/Time    COVID19 Not Detected 11/11/2020 02:26 PM           Anesthesia Evaluation  Patient summary reviewed  Airway: Mallampati: II  TM distance: >3 FB   Neck ROM: full  Mouth opening: > = 3 FB   Dental: normal exam         Pulmonary:Negative Pulmonary ROS and normal exam                               Cardiovascular:Negative CV ROS                      Neuro/Psych:   (+) psychiatric history:            GI/Hepatic/Renal:   (+) GERD:,           Endo/Other: Negative Endo/Other ROS                    Abdominal:             Vascular: negative vascular ROS. Other Findings:           Anesthesia Plan      epidural     ASA 2             Anesthetic plan and risks discussed with patient.               Post-op pain plan if not by surgeon: continuous epidural            Chris Sarmiento APRN - CRNA   3/23/2023

## 2023-03-24 NOTE — ANESTHESIA PROCEDURE NOTES
Epidural Block    Patient location during procedure: OB  Start time: 3/24/2023 12:16 AM  End time: 3/24/2023 12:27 AM  Reason for block: labor epidural  Staffing  Performed: resident/CRNA   Anesthesiologist: Vivianne Lefort, MD  Resident/CRNA: HUGO Solares CRNA  Epidural  Patient position: sitting  Prep: Betadine  Patient monitoring: continuous pulse ox and frequent blood pressure checks  Approach: midline  Location: L4-5  Injection technique: DELMY saline  Guidance: paresthesia technique  Provider prep: mask and sterile gloves  Needle  Needle type: Tuohy   Needle gauge: 17 G  Needle length: 3.5 in  Needle insertion depth: 6 cm  Catheter type: end hole  Catheter at skin depth: 12 cm  Test dose: negativeCatheter Secured: tegaderm and tape  Assessment  Hemodynamics: stable  Attempts: 1  Outcomes: uncomplicated and patient tolerated procedure well  Preanesthetic Checklist  Completed: patient identified, IV checked, site marked, risks and benefits discussed, surgical/procedural consents, equipment checked, pre-op evaluation, timeout performed, anesthesia consent given, oxygen available and monitors applied/VS acknowledged

## 2023-03-24 NOTE — FLOWSHEET NOTE
Patient admitted to room 737 from L&D via wheelchair. Oriented to room and surroundings. Plan of care reviewed. Verbalized understanding. Instructed on infant security and safe sleep practices. Preventing falls education provided . The following handouts given: A New Beginning: Your Guide to Postpartum Care, Rounding, gs Security System,Babies Cry A lot, Safe Sleep, Security and Visitation Guidelines. Call light placed within reach.

## 2023-03-24 NOTE — PROGRESS NOTES
Labor Progress Note    Billy Garcia is a 32 y.o. female  at 43w3d  The patient was seen and examined. Her pain is well controlled. She reports fetal movement is present, complains of contractions, denies loss of fluid, denies vaginal bleeding.        Vital Signs:  Vitals:    23 1351 23 1812 23 2358   BP: 111/77 108/69 117/84 133/84   Pulse: (!) 102 89 71 90   Resp: 18 16 16 16   Temp: 98 °F (36.7 °C) 98.1 °F (36.7 °C) 98.3 °F (36.8 °C) 97.7 °F (36.5 °C)   TempSrc: Oral Oral Oral Oral   SpO2: 98%  97% 96%         FHT: 120, moderate variability, accelerations present, decelerations absent  Contractions: not tracing    Chaperone for Intimate Exam: Chaperone was present for entire exam, Chaperone Name: Fidencio James RN  Cervical Exam: 2-3/70/-1  Pitocin: @ 0 mu/min    Membranes: Intact  Scalp Electrode in place: absent  Intrauterine Pressure Catheter in Place: absent    Interventions: SVE, martinez balloon placed without difficulty and then removed due to patient request    Assessment/Plan:  Billy Garcia is a 32 y.o. female  at 43w3d admitted for RR IOL   - GBS bacteriuria, Pen G for GBS prophylaxis   - VSS, afebrile   - cEFM/TOCO   - S/p Cytotec 25 PV x1   - S/p nitrous oxide   - S/p Morphine/Compazine x1   - Epidural ordered per patient request   - Will plan for pitocin per protocol following epidural placement   - Continue to monitor closely      Jeet Burch DO  Ob/Gyn Resident  3/24/2023, 12:03 AM

## 2023-03-24 NOTE — CARE COORDINATION
ANTEPARTUM NOTE    39 weeks gestation of pregnancy [Z3A.39]    Trisha Ellsworth was admitted to L&D on 3/23/2023 for contractions and LOF @ 39 4/7    OB GYN Provider: Dr Sandra Dias    Will meet with patient after delivery to verify name/address/phone/insurance and discuss discharge planning. Anticipate DC home 2 nights after vaginal delivery or 4 nights after C/S delivery as long as hemodynamically stable.

## 2023-03-24 NOTE — FLOWSHEET NOTE
Pt educated on the use of self-administered  nitrous oxide for pain control and side effects. Pt educated on when and how to use the mask appropriately. Pt educated that she is the only person allowed to hold the mask to her face and that no one should prop the mask against her face. Pt also educated that she is the only person in the room allowed to use the mask. Pt informed that she cannot be out of bed without a trained support person with her. Pt completed a return demonstration of the use of nitrous oxide and all questions and concerns were addressed. RN verified the presence of a signed agreement form for the nitrous oxide. Pre-intervention VS, assessment, and FHT'st as charted. Nitrous oxide and oxygen at a 50-50 mix was initiated at 2015. RN remains at bedside for the first 15 mins post initiation. Pt has experienced no side effects at this time.

## 2023-03-24 NOTE — ANESTHESIA POSTPROCEDURE EVALUATION
Department of Anesthesiology  Postprocedure Note    Patient: Trenton Harper  MRN: [id number]  YOB: 1995  Date of evaluation: 3/24/2023      Procedure Summary     Date: 03/24/23 Room / Location:     Anesthesia Start: 0016 Anesthesia Stop: 2806    Procedure: Labor Analgesia Diagnosis:     Scheduled Providers:  Responsible Provider: Douglas Corado MD    Anesthesia Type: epidural ASA Status: 2          Anesthesia Type: No value filed.     Coral Phase I:      Coral Phase II:        Anesthesia Post Evaluation    Patient location during evaluation: floor  Patient participation: complete - patient participated  Level of consciousness: awake and alert  Pain score: 1  Airway patency: patent  Nausea & Vomiting: no nausea and no vomiting  Complications: no  Cardiovascular status: blood pressure returned to baseline and hemodynamically stable  Respiratory status: acceptable and room air  Hydration status: stable  Multimodal analgesia pain management approach

## 2023-03-24 NOTE — ED PROVIDER NOTES
But no vision changes. No history of headaches, does not have any headaches during this pregnancy. Also states that she had been having some flank pain, but now it is on the right side and feels like it is up under her rib cage. Previously had been on the left. No lower abdominal pain. No rush of fluids. On exam, patient is resting comfortably in no acute distress. She is normocephalic atraumatic. Pupils are equal and reactive with normal extraocular eye movements. Heart sounds are regular with no murmurs rubs or gallops. Lungs are clear to auscultation bilaterally. Decreased at bases secondary to likely diaphragm elevation from late term pregnancy. Abdomen is obviously gravid. Unable to perform Gradie Kinds sign at this time. No obvious tenderness. No CVA tenderness. Is alert oriented x4. Does have some slightly increased reflexes in bilateral lower extremities. No significant pitting edema. Normal strength and sensation    Medical Decision Making  Patient in the late third trimester pregnancy with sudden onset shortness of breath. Is not hypoxic. Has stable vital signs    No significant family history of DVT or PE. However does have the risk factor that she is third-trimester pregnant. D-dimer was obtained, and is elevated using years criteria. Therefore will get CT rule out PE study. Patient does have a mild headache, and mild increased reflexes but has normal blood pressures. Does not make a clear picture for preeclampsia however will immediately talk to OB, who did order the preeclampsia labs. There is no neurodeficits, or any other associated symptoms with this mild headache. Therefore low suspicion of other headache etiologies such as venous thrombosis. Bedside ultrasound performed by resident physician to obtain fetal heart rate.     Once work-up negative will send up to OB    Problems Addressed:  Shortness of breath: acute illness or injury    Amount and/or Complexity of Data Reviewed  Labs: ordered. Decision-making details documented in ED Course. Radiology: ordered. ECG/medicine tests: ordered and independent interpretation performed. Risk  Prescription drug management. EKG Interpretation    Interpreted by emergency department physician    Clinical Impression: Sinus tachy, nonspecific with no signs of acute ischemia.      Ulisses Hester MD      Critical Care: None     Ulisses Hester MD  Attending Emergency Physician        Ulisses Hester MD  03/24/23 3139

## 2023-03-24 NOTE — PROGRESS NOTES
Labor Progress Note    Cammie Rivera is a 32 y.o. female  at 38w11d  The patient was seen and examined. Her pain is well controlled. She reports fetal movement is present, denies contractions, complains of loss of fluid, denies vaginal bleeding. Notified by  RN of gross rupture of membranes on pad.  Pad with pooling of meconium stained fluid present    Vital Signs:  Vitals:    23 0245 23 0301 23 0401 23 0501   BP: 110/71 123/65 (!) 129/53 130/72   Pulse: (!) 109 92 91 100   Resp: 16 16 16 16   Temp:   98.3 °F (36.8 °C)    TempSrc:   Oral    SpO2: 97%  94% 98%         FHT: 130, moderate variability, accelerations present, decelerations absent  Contractions: regular, every 2-3 minutes    Chaperone for Intimate Exam: Chaperone was present for entire exam, Chaperone Name: Jonny Juarez RN  Cervical Exam: 3-4/70/0  Pitocin: @ 6 mu/min    Membranes: Ruptured meconium stained  Scalp Electrode in place: absent  Intrauterine Pressure Catheter in Place: absent    Interventions: SVE     Assessment/Plan:  Cammie Rivera is a 32 y.o. female  at 38w11d admitted for RR IOL   - GBS bacteriuria, Pen G for GBS prophylaxis   - VS, afebrile   - cEFM/TOCO   - S/p Cytotec 25 mcg PV x1   - S/p martinez balloon placement (removed immediately after placement per patient request)   - S/p nitrous oxide   - S/p morphine/compazine x1   - Epidural in place with adequate pain control   - SROM meconium stained fluid @ 0424 3/24   - Continue pitocin per protocol   - Continue to monitor closely    Senior resident updated and in agreement with plan    Kaitlin Beck DO  Ob/Gyn Resident  3/24/2023, 5:13 AM

## 2023-03-25 VITALS
OXYGEN SATURATION: 99 % | TEMPERATURE: 98.2 F | SYSTOLIC BLOOD PRESSURE: 116 MMHG | RESPIRATION RATE: 18 BRPM | HEART RATE: 82 BPM | DIASTOLIC BLOOD PRESSURE: 73 MMHG

## 2023-03-25 PROCEDURE — 6370000000 HC RX 637 (ALT 250 FOR IP): Performed by: STUDENT IN AN ORGANIZED HEALTH CARE EDUCATION/TRAINING PROGRAM

## 2023-03-25 RX ORDER — OXYCODONE HYDROCHLORIDE 5 MG/1
5 TABLET ORAL EVERY 4 HOURS PRN
Status: DISCONTINUED | OUTPATIENT
Start: 2023-03-25 | End: 2023-03-25 | Stop reason: HOSPADM

## 2023-03-25 RX ORDER — CYCLOBENZAPRINE HCL 10 MG
10 TABLET ORAL 3 TIMES DAILY PRN
Status: DISCONTINUED | OUTPATIENT
Start: 2023-03-25 | End: 2023-03-25 | Stop reason: HOSPADM

## 2023-03-25 RX ORDER — GABAPENTIN 300 MG/1
300 CAPSULE ORAL 3 TIMES DAILY PRN
Status: DISCONTINUED | OUTPATIENT
Start: 2023-03-25 | End: 2023-03-25 | Stop reason: HOSPADM

## 2023-03-25 RX ORDER — OXYCODONE HYDROCHLORIDE 5 MG/1
5 TABLET ORAL EVERY 6 HOURS PRN
Qty: 12 TABLET | Refills: 0 | Status: SHIPPED | OUTPATIENT
Start: 2023-03-25 | End: 2023-03-28

## 2023-03-25 RX ORDER — GABAPENTIN 300 MG/1
300 CAPSULE ORAL 3 TIMES DAILY
Status: DISCONTINUED | OUTPATIENT
Start: 2023-03-25 | End: 2023-03-25

## 2023-03-25 RX ADMIN — GABAPENTIN 300 MG: 300 CAPSULE ORAL at 11:47

## 2023-03-25 RX ADMIN — IBUPROFEN 600 MG: 600 TABLET, FILM COATED ORAL at 03:37

## 2023-03-25 RX ADMIN — CYCLOBENZAPRINE 10 MG: 10 TABLET, FILM COATED ORAL at 03:37

## 2023-03-25 RX ADMIN — CYCLOBENZAPRINE 10 MG: 10 TABLET, FILM COATED ORAL at 11:47

## 2023-03-25 RX ADMIN — DOCUSATE SODIUM 50 MG AND SENNOSIDES 8.6 MG 2 TABLET: 8.6; 5 TABLET, FILM COATED ORAL at 09:43

## 2023-03-25 RX ADMIN — IBUPROFEN 600 MG: 600 TABLET, FILM COATED ORAL at 09:43

## 2023-03-25 RX ADMIN — GABAPENTIN 300 MG: 300 CAPSULE ORAL at 03:37

## 2023-03-25 RX ADMIN — ACETAMINOPHEN 1000 MG: 500 TABLET ORAL at 03:37

## 2023-03-25 RX ADMIN — ACETAMINOPHEN 1000 MG: 500 TABLET ORAL at 09:43

## 2023-03-25 ASSESSMENT — PAIN SCALES - GENERAL
PAINLEVEL_OUTOF10: 3
PAINLEVEL_OUTOF10: 6
PAINLEVEL_OUTOF10: 7
PAINLEVEL_OUTOF10: 4

## 2023-03-25 ASSESSMENT — PAIN DESCRIPTION - DESCRIPTORS
DESCRIPTORS: SORE
DESCRIPTORS: CRAMPING;SORE
DESCRIPTORS: CRAMPING;DISCOMFORT

## 2023-03-25 ASSESSMENT — PAIN DESCRIPTION - LOCATION
LOCATION: PERINEUM
LOCATION: ABDOMEN
LOCATION: ABDOMEN

## 2023-03-25 ASSESSMENT — PAIN DESCRIPTION - ORIENTATION: ORIENTATION: MID

## 2023-03-25 NOTE — CARE COORDINATION
CASE MANAGEMENT POST-PARTUM TRANSITIONAL CARE PLAN    39 weeks gestation of pregnancy [Z3A.39]    OB Provider: Dr. Dominick Jansen met w/ Carlos Strickland and FOB/BF Christina Flatness at bedside to discuss DCP. She is S/P  on 3/24/23 @ 6766. Writer updated address, phone number correct on facesheet. CM added Liam Rossrocael as an emergency contact. She states she lives with her BF/FOB Christina Flatness. She moved in with Liam Rossrocael when she learned she was pregnant. They are now looking to move to [facility]. Carlos Strickland verbalized no difficulties with transportation to and from doctors appointments or with paying for medications upon discharge home. [facility] [place] Medicaid insurance correct. Writer notified Carlos Strickland she has 30 days from date of birth to add  to insurance policy by contacting [address] [place] [address]. Liam Ace is insured under his parents and this CM discussed how grandparents cannnot insure grandchildren. They verbalized understanding. Carlos Strickland and Liam Ace confirmed a safe place for infant to sleep at home. Infant name on BC: Tomasa Pierce. Infant PCP [facility].      DME: none  HOME CARE: none    Anticipate DC of couplet 3/25/23    Readmission Risk              Risk of Unplanned Readmission:  10

## 2023-03-25 NOTE — FLOWSHEET NOTE
I have reviewed all [facility] Post-Birth Warning Signs and essential teaching points for pulmonary embolism, cardiac disease, hypertensive disorders of pregnancy, obstetric hemorrhage, venous thromboembolism, infection, and postpartum depression with the patient and FOB (support person) . I have informed the patient on when to call their healthcare provider and when to call 911. I have discussed with the patient  the importance of scheduling a follow-up visit with their physician, nurse practitioner or midwife and provided them with correct contact information for appointment. I have provided the patient with a copy of the \"Save Your Life\" handout. The patient has acknowledged receiving and understanding this education with her signature. Discharge instructions and follow up care reviewed.

## 2023-03-25 NOTE — PROGRESS NOTES
POST PARTUM DAY # 1    Magali Encarnacion is a 32 y.o. female  This patient was seen & examined today. Her pregnancy was complicated by:   Patient Active Problem List   Diagnosis    Depression    Anxiety    Bipolar    Elevated BP x1    THC Use    Cystic fibrosis carrier    VAVD w/ third degree 3/24/23 F Apg  Wt 8#1        Today she is doing well without any chief complaint. Her lochia is light. She denies chest pain, shortness of breath, headache, lightheadedness, and blurred vision. She is bottle feeding and she denies any breast tenderness. She is ambulating well. Her voiding pattern is normal. I reviewed signs and symptoms of post partum depression with the patient, she currently denies any of these symptoms. She is tolerating solids.      Vital Signs:  Vitals:    23 1145 23 1225 23 1600 23   BP: 100/60 (!) 105/59 118/79 108/63   Pulse: 88 75 97 93   Resp: 16 16 16 16   Temp: 98.1 °F (36.7 °C) 98.2 °F (36.8 °C) 98.2 °F (36.8 °C) 98.2 °F (36.8 °C)   TempSrc: Oral Oral Oral Oral   SpO2: 99% 97%  98%         Physical Exam:  General:  no apparent distress, alert, and cooperative  Neurologic:  alert, oriented, normal speech, no focal findings or movement disorder noted  Lungs:  No increased work of breathing, good air exchange, clear to auscultation bilaterally, no crackles or wheezing  Heart:  regular rate and rhythm    Abdomen: abdomen soft, non-distended, non-tender  Fundus: non-tender, normal size, firm, below umbilicus  Extremities:  no calf tenderness, non edematous    Lab:  Lab Results   Component Value Date    HGB 12.4 2023     Lab Results   Component Value Date    HCT 38.0 2023       Assessment/Plan:  Magali Encarnacion is a  PPD # 1 s/p VAVD with Third Degree Laceration   - Doing well, VSS   - Female infant in [facility]   - Encourage ambulation   - Labs if sx   - Motrin and tylenol for pain   - Ninoska colace and MOM ordered for bowel regiment   - S/p Toradol 98% 99% 99%     No results found for this or any previous visit (from the past 24 hour(s)). PPD#1 VAVD, female  Rh+  Anxiety/Depression - Continue home medications, high risk of PP depression - counseled on s/s and to notify office immediatelyl for concern. SW consult for [facility] use          Discharge Instructions for Labor and Delivery, Vaginal Birth     A vaginal birth refers to the baby being delivered through the vagina. The amount of time that labor can take varies greatly. Labor for the average first-born baby is about 12 hours. Usually your hospital stay after a routine delivery is no more than two nights. Some new mothers go home the same day. Recovery from childbirth varies depending upon whether you had an episiotomy (an incision in the perineum, the area between your vaginal opening and your anus), the duration of labor and delivery, and the amount of rest you get. In general, it takes about 6-8 weeks for a woman's body to recover from childbirth. What You Will Need   Along with your medications, you will need the following:   Sanitary pads    Nursing pads    Witch hazel pads    Sitz bath    Steps to [address] will want to arrange for transportation home for you and your baby. The baby will need a car seat. You will receive instructions in the hospital for breastfeeding and taking care of the perineum area. You may use ointment for cracked nipples or warm water rinses to your perineum. You will need to wear sanitary pads for about six weeks after delivery. If you had an episiotomy or vaginal tear, you will be sent home with a plastic squirt bottle. Fill it with warm water and squirt over the vaginal and anal area every time you urinate and defecate. Warm baths can be soothing to healing tissues. Apply warm or cold cloths to sore breasts. Apply ointment to cracked nipples.     Use nursing pads for leaky breast.    Apply witch hazel pads to sore perineum (area between

## 2023-03-27 NOTE — PROGRESS NOTES
CLINICAL PHARMACY NOTE: MEDS TO BEDS    Total # of Prescriptions Filled: 2   The following medications were delivered to the patient:  Ibuprofen  Senna plus    Additional Documentation:  Milk of Magnesium was out of stock- hung in will call Monday 03/27

## 2023-03-29 ENCOUNTER — POSTPARTUM VISIT (OUTPATIENT)
Dept: OBGYN CLINIC | Age: 28
End: 2023-03-29

## 2023-03-29 VITALS
HEIGHT: 62 IN | SYSTOLIC BLOOD PRESSURE: 120 MMHG | DIASTOLIC BLOOD PRESSURE: 78 MMHG | BODY MASS INDEX: 45.08 KG/M2 | WEIGHT: 245 LBS

## 2023-03-29 DIAGNOSIS — Z37.9 VACUUM-ASSISTED VAGINAL DELIVERY: ICD-10-CM

## 2023-03-29 PROCEDURE — 99024 POSTOP FOLLOW-UP VISIT: CPT | Performed by: OBSTETRICS & GYNECOLOGY

## 2023-03-29 NOTE — PROGRESS NOTES
good bowel sounds; no guarding, rebound or rigidity; no CVA tenderness bilaterally. Extremities: No calf tenderness bilaterally. DTR 2/4 bilaterally. No edema. Perineum: Healing well, laceration is well approximated without s/s of infection, breakdown or concern    Assessment:   Diagnosis Orders   1. Postpartum care and examination        2. VAVD w/ third degree 3/24/23 F Apg 7/9 Wt 8#1           Chief Complaint   Patient presents with    Postpartum Care     Patient Active Problem List    Diagnosis Date Noted    THC Use 03/20/2023     Priority: Medium    Cystic fibrosis carrier 03/20/2023     Priority: Medium     FOB negative      Bipolar 08/11/2022     Priority: Medium    VAVD w/ third degree 3/24/23 F Apg 7/9 Wt 8#1  03/24/2023    Elevated BP x1 03/20/2023     In ER 3/20/23 at 39w1d  BP checked over 4 hours, normotensive      Depression 05/03/2021    Anxiety 05/03/2021     Plan:  1. Return to the office in 2-3 weeks  2. Signs & Symptoms of mastitis reviewed; notify if occurs  3. Secondary smoke risks reviewed. Increased risks of respiratory problems, Sudden     infant death syndrome, and potential malignancies. 4. Abstinence  5. Family planning counseling and STD counseling completed - IUD 4-6 weeks PP  6. Return in about 2 weeks (around 4/12/2023) for postpartum. 7. Continue Anxiety/depression medications, watch closely for PP depression  8. +BM +voiding, no concerns. Patient was seen with total face to face time of 20 minutes. More than 50% of this visit was on counseling and education regarding her    Diagnosis Orders   1. Postpartum care and examination        2. VAVD w/ third degree 3/24/23 F Apg 7/9 Wt 8#1          and her options. She was also counseled on her preventative health maintenance recommendations and follow-up.

## 2023-04-19 DIAGNOSIS — Z01.812 PRE-PROCEDURE LAB EXAM: Primary | ICD-10-CM

## 2023-04-24 ENCOUNTER — NURSE ONLY (OUTPATIENT)
Dept: LAB | Age: 28
End: 2023-04-24

## 2023-04-24 DIAGNOSIS — Z01.812 PRE-PROCEDURE LAB EXAM: ICD-10-CM

## 2023-04-25 ENCOUNTER — PROCEDURE VISIT (OUTPATIENT)
Dept: OBGYN CLINIC | Age: 28
End: 2023-04-25
Payer: MEDICAID

## 2023-04-25 VITALS
DIASTOLIC BLOOD PRESSURE: 78 MMHG | SYSTOLIC BLOOD PRESSURE: 120 MMHG | BODY MASS INDEX: 45.27 KG/M2 | WEIGHT: 246 LBS | HEIGHT: 62 IN

## 2023-04-25 DIAGNOSIS — Z30.430 ENCOUNTER FOR IUD INSERTION: ICD-10-CM

## 2023-04-25 DIAGNOSIS — N94.6 DYSMENORRHEA: Primary | ICD-10-CM

## 2023-04-25 LAB — HCG INTACT+B SERPL-ACNC: < 1 MIU/ML (ref 0–5)

## 2023-04-25 PROCEDURE — 58300 INSERT INTRAUTERINE DEVICE: CPT | Performed by: NURSE PRACTITIONER

## 2023-04-25 NOTE — PROGRESS NOTES
SUBJECTIVE:    CC:    Chief Complaint   Patient presents with    Procedure     IUD       Victoriano Hurtado presents today for insertion of Mirena IUD for her complaint of  dysmenorrhea. Victoriano Hurtado understands the risks and benefits of the intrauterine device insertion and desires to proceed with its insertion. OBJECTIVE:    /78 (Site: Right Upper Arm, Position: Sitting, Cuff Size: Medium Adult)   Ht 5' 2\" (1.575 m)   Wt 246 lb (111.6 kg)   LMP 06/19/2022   Breastfeeding No   BMI 44.99 kg/m²      pregnancy test: neg serum HCG on 4/24/23  Gyn history: Cultures: declined  Medical history: No known contraindication to progestin use. She is not a smoker. The patient denies any family member or herself as having a blood clot in their leg, lung, brain or that any member of her family has had a sudden cardiac death under the age of 37 yo. Procedure: The patient was positioned comfortably on the exam table. After a bi-manual exam; the uterus was found to be  anteverted. There was no cervical motion tenderness. A sterile speculum was inserted. The cervix was visualized and prepped with Betadine. An  allis clamp was applied to the anterior lip of the cervix. A sound was placed through the cervical os in sterile fashion and the uterus was sounded to 8 cm. The Mirena IUD was loaded in the applicator and the indicator placed according to the sound. The applicator was then inserted into the cervix and the Intrauterine Device was placed in the endometrial cavity. The applicator was removed and the strings were trimmed to 3 cm. The patient tolerated the procedure without difficulty. She was instructed nothing in vagina for 72 hours and extra form contraception for 30 days. She is to notify the office or go to the nearest Emergency Department if she experiences Abdominal Pain, Temperatures more than 101 F, Odiferous Vaginal Discharge, Dizziness or Shortness of breath. ASSESSMENT:  IUD insertion    PLAN:  1.

## 2023-04-25 NOTE — PATIENT INSTRUCTIONS
Depression After Childbirth: Care Instructions  Your Care Instructions    Many women get the \"baby blues\" during the first few days after childbirth. You may lose sleep, feel irritable, and cry easily. You may feel happy one minute and sad the next. Hormone changes are one cause of these emotional changes. Also, the demands of a new baby, along with visits from relatives or other family needs, add to a mother's stress. The \"baby blues\" often peak around the fourth day. Then they ease up in less than 2 weeks. If your moodiness or anxiety lasts for more than 2 weeks, or if you feel like life is not worth living, you may have postpartum depression. This is different for each mother. Some mothers with serious depression may worry intensely about their infant's well-being. Others may feel distant from their child. Some mothers might even feel that they might harm their baby. A mother may have signs of paranoia, wondering if someone is watching her. Depression is not a sign of weakness. It is a medical condition that requires treatment. Medicine and counseling often work well to reduce depression. Talk to your doctor about taking antidepressant medicine while breastfeeding. Follow-up care is a key part of your treatment and safety. Be sure to make and go to all appointments, and call your doctor if you are having problems. It's also a good idea to know your test results and keep a list of the medicines you take. How do you know if you are depressed? With all the changes in your life, you may not know if you are depressed. Pregnancy sometimes causes changes in how you feel that are similar to the symptoms of depression. Symptoms of depression include:  Feeling sad or hopeless and losing interest in daily activities. These are the most common symptoms of depression. Sleeping too much or not enough. Feeling tired. You may feel as if you have no energy. Eating too much or too little.   Writing or talking about death,

## 2023-05-02 ENCOUNTER — TELEPHONE (OUTPATIENT)
Dept: OBGYN CLINIC | Age: 28
End: 2023-05-02

## 2023-05-05 ENCOUNTER — POSTPARTUM VISIT (OUTPATIENT)
Dept: OBGYN CLINIC | Age: 28
End: 2023-05-05

## 2023-05-05 VITALS
BODY MASS INDEX: 45.45 KG/M2 | HEIGHT: 62 IN | WEIGHT: 247 LBS | SYSTOLIC BLOOD PRESSURE: 120 MMHG | DIASTOLIC BLOOD PRESSURE: 78 MMHG

## 2023-05-05 DIAGNOSIS — F41.8 DEPRESSION WITH ANXIETY: ICD-10-CM

## 2023-05-05 DIAGNOSIS — Z97.5 IUD CONTRACEPTION: ICD-10-CM

## 2023-05-05 DIAGNOSIS — Z37.9 VACUUM-ASSISTED VAGINAL DELIVERY: ICD-10-CM

## 2023-05-05 PROBLEM — R03.0 ELEVATED BP WITHOUT DIAGNOSIS OF HYPERTENSION: Status: RESOLVED | Noted: 2023-03-20 | Resolved: 2023-05-05

## 2023-05-05 ASSESSMENT — ANXIETY QUESTIONNAIRES
5. BEING SO RESTLESS THAT IT IS HARD TO SIT STILL: 0
6. BECOMING EASILY ANNOYED OR IRRITABLE: 3
3. WORRYING TOO MUCH ABOUT DIFFERENT THINGS: 3
IF YOU CHECKED OFF ANY PROBLEMS ON THIS QUESTIONNAIRE, HOW DIFFICULT HAVE THESE PROBLEMS MADE IT FOR YOU TO DO YOUR WORK, TAKE CARE OF THINGS AT HOME, OR GET ALONG WITH OTHER PEOPLE: EXTREMELY DIFFICULT
4. TROUBLE RELAXING: 3
GAD7 TOTAL SCORE: 18
1. FEELING NERVOUS, ANXIOUS, OR ON EDGE: 3
2. NOT BEING ABLE TO STOP OR CONTROL WORRYING: 3
7. FEELING AFRAID AS IF SOMETHING AWFUL MIGHT HAPPEN: 3

## 2023-05-05 NOTE — PROGRESS NOTES
[facility],[address] [facility] [address]  [address] [address]  Dept: [phone]    Patient Name: Clau Burns  Patient Age: 32 y.o. Date of Visit: 5/5/2023    Chief Complaint   Patient presents with    Postpartum Care       HPI:  DELIVERY DATE: 3/24/23  TYPE OF DELIVERY: vacuum-assisted vaginal delivery  PROVIDER:   PERINEUM: left sulcal and 3rd degree repaired with 3-0    Malissa Gilma was seen for her 6 week visit. Her Female infant is healthy. She is bottle feeding. She is not experiencing pain. She reports her lochia is no bleeding  She reports none perineal discomfort. She denies urinary incontinence. Her bowels have returned to her normal pattern. She is back to her normal activity pattern. She has her first menses. This was on x2 weeks ago. Malissa Dillard has engaged in intercourse. Has had sex used condoms no pain. She does report a mood disorder. Struggling with depression. This past Monday thru Thursday baby stayed with her parents due to being overwhelmed, not bonding well with baby. Kesha did not miss baby when she was gone, but that she loves her but feeling very overwhelmed. Reports since birth she felt very attached/loving/wanting to hold her and now is opposite. This all started when having to care for her 24/7 for 2 weeks because partner was on nights. Kesha does not feel she wants to hurt the baby or herself. Reports she is feeling better today. Helpful having Donald at home. Kesha has always struggled with night time and being alone. She feels she is having difficulty coping. Malissa Dillard feels her family adjustment is effective. She reports an overall positive birth experience.   In the past 7 days:  I have been able to laugh and see the funny side of things: Definitely not so much now  I have looked forward with enjoyment to things: Definitely less than I used to  I have blamed myself unnecessarily when things went

## 2023-07-24 ENCOUNTER — TELEPHONE (OUTPATIENT)
Dept: OBGYN CLINIC | Age: 28
End: 2023-07-24

## 2023-07-24 DIAGNOSIS — R10.9 ABDOMINAL CRAMPING: ICD-10-CM

## 2023-07-24 DIAGNOSIS — R10.2 PELVIC PAIN: ICD-10-CM

## 2023-07-24 DIAGNOSIS — Z30.431 IUD CHECK UP: Primary | ICD-10-CM

## 2023-07-24 DIAGNOSIS — R14.0 ABDOMINAL BLOATING: ICD-10-CM

## 2023-07-24 NOTE — TELEPHONE ENCOUNTER
Patient had IUD placed at 4 week post partum. She had 1st period last month was normal. This month she is VERY bloated and in pain. Cramping and back pains. No bleeding this month. She had not had US yet to check placement- will order US. Anything else you would recommend?

## 2023-07-25 DIAGNOSIS — R10.2 PELVIC PAIN: ICD-10-CM

## 2023-07-25 DIAGNOSIS — R10.9 ABDOMINAL CRAMPING: ICD-10-CM

## 2023-07-25 DIAGNOSIS — R14.0 ABDOMINAL BLOATING: ICD-10-CM

## 2023-07-25 DIAGNOSIS — Z30.431 IUD CHECK UP: ICD-10-CM

## 2023-07-25 NOTE — TELEPHONE ENCOUNTER
Rec motrin/tylenol. If pain continues we can talk about options. Per the ultrasound read it appears IUD in is appropriate location. The period may not be related to IUD and would recommend 2-3 more cycles if she can tolerate as she just had it placed recently.

## 2023-08-14 ENCOUNTER — HOSPITAL ENCOUNTER (OUTPATIENT)
Age: 28
Setting detail: SPECIMEN
Discharge: HOME | End: 2023-08-14

## 2023-08-14 ENCOUNTER — OFFICE VISIT (OUTPATIENT)
Dept: OBGYN CLINIC | Age: 28
End: 2023-08-14
Payer: MEDICAID

## 2023-08-14 ENCOUNTER — TELEPHONE (OUTPATIENT)
Dept: OBGYN CLINIC | Age: 28
End: 2023-08-14

## 2023-08-14 ENCOUNTER — NURSE TRIAGE (OUTPATIENT)
Dept: OTHER | Age: 28
End: 2023-08-14

## 2023-08-14 VITALS
WEIGHT: 247 LBS | HEIGHT: 62 IN | SYSTOLIC BLOOD PRESSURE: 126 MMHG | BODY MASS INDEX: 45.45 KG/M2 | DIASTOLIC BLOOD PRESSURE: 72 MMHG

## 2023-08-14 DIAGNOSIS — R10.2 VAGINAL PAIN: ICD-10-CM

## 2023-08-14 DIAGNOSIS — R10.2 PELVIC PAIN: ICD-10-CM

## 2023-08-14 DIAGNOSIS — N83.202 CYST OF LEFT OVARY: ICD-10-CM

## 2023-08-14 DIAGNOSIS — N89.8 VAGINAL DISCHARGE: ICD-10-CM

## 2023-08-14 DIAGNOSIS — Z30.431 IUD CHECK UP: ICD-10-CM

## 2023-08-14 DIAGNOSIS — R10.2 VAGINAL PAIN: Primary | ICD-10-CM

## 2023-08-14 PROCEDURE — 1036F TOBACCO NON-USER: CPT | Performed by: NURSE PRACTITIONER

## 2023-08-14 PROCEDURE — 99214 OFFICE O/P EST MOD 30 MIN: CPT | Performed by: NURSE PRACTITIONER

## 2023-08-14 PROCEDURE — G8427 DOCREV CUR MEDS BY ELIG CLIN: HCPCS | Performed by: NURSE PRACTITIONER

## 2023-08-14 PROCEDURE — G8417 CALC BMI ABV UP PARAM F/U: HCPCS | Performed by: NURSE PRACTITIONER

## 2023-08-14 ASSESSMENT — ENCOUNTER SYMPTOMS
SHORTNESS OF BREATH: 0
VOMITING: 0
WHEEZING: 0
COLOR CHANGE: 0
NAUSEA: 0

## 2023-08-14 NOTE — TELEPHONE ENCOUNTER
She needs to have appointment for pelvic exam and we will need to order her a pelvic US, can she come in at 130 , 145 or 345 today ?

## 2023-08-14 NOTE — PROGRESS NOTES
Lamotrigine     Sulfamethoxazole-Trimethoprim Hives     Other reaction(s): Unknown  Other reaction(s): Unknown       Health Maintenance   Topic Date Due    Varicella vaccine (2 of 2 - 13+ 2-dose series) 08/28/2008    COVID-19 Vaccine (3 - Booster for Marquis series) 02/24/2022    Flu vaccine (1) 08/01/2023    Depression Monitoring  02/07/2024    Pap smear  05/03/2024    DTaP/Tdap/Td vaccine (4 - Td or Tdap) 02/16/2033    Meningococcal (ACWY) vaccine  Completed    Hepatitis C screen  Completed    HIV screen  Completed    Hepatitis A vaccine  Aged Out    Hib vaccine  Aged Out    Pneumococcal 0-64 years Vaccine  Aged Out    HPV vaccine  Discontinued       Subjective:     Review of Systems   Constitutional:  Negative for chills and fever. Respiratory:  Negative for shortness of breath and wheezing. Cardiovascular:  Negative for chest pain and leg swelling. Gastrointestinal:  Negative for nausea and vomiting. Genitourinary:  Positive for dyspareunia, pelvic pain, vaginal bleeding and vaginal pain. Negative for dysuria, flank pain, frequency, urgency and vaginal discharge. Skin:  Negative for color change and pallor. Neurological:  Negative for dizziness and headaches. Hematological:  Negative for adenopathy. Does not bruise/bleed easily. Psychiatric/Behavioral:  Negative for self-injury and suicidal ideas. Objective:     Physical Exam  Vitals and nursing note reviewed. Constitutional:       General: She is not in acute distress. Appearance: She is well-developed. She is not diaphoretic. HENT:      Head: Normocephalic and atraumatic. Right Ear: External ear normal.      Left Ear: External ear normal.      Nose: Nose normal.   Eyes:      Pupils: Pupils are equal, round, and reactive to light. Cardiovascular:      Rate and Rhythm: Normal rate and regular rhythm. Heart sounds: Normal heart sounds. No murmur heard. No friction rub. No gallop.    Pulmonary:      Effort: Pulmonary

## 2023-08-14 NOTE — TELEPHONE ENCOUNTER
Pt called with concerns about her IUD possibly not being in place. She stated that she was feeling for the strings, and thinks she has a hole/tear in her cervix. She said that she is having pain. Wants to know what she should do. If she needs an appt, or if she needs to go be seen somewhere?

## 2023-08-14 NOTE — TELEPHONE ENCOUNTER
Location of patient: [address] [place] [address]    Brief description of triage: \"I can not feel my IUD and I feel like there is a hole in my cervix\"    Triage indicates for patient to See PCP within 3 Days    Care advice provided, patient verbalizes understanding; denies any other questions or concerns; instructed to call back for any new or worsening symptoms. Attention Provider: Thank you for allowing me to participate in the care of your patient. Please do not respond through this encounter as the response is not directed to a shared pool.         Reason for Disposition   Cannot feel IUD string or worried that IUD is not in right place (e.g., string longer than usual, can feel hard plastic of IUD)    Protocols used: Contraception - IUD Symptoms and Questions-ADULT-

## 2023-08-15 ENCOUNTER — PATIENT MESSAGE (OUTPATIENT)
Dept: OBGYN CLINIC | Age: 28
End: 2023-08-15

## 2023-08-15 LAB
C TRACH DNA SPEC QL PROBE+SIG AMP: NEGATIVE
CANDIDA SPECIES: POSITIVE
GARDNERELLA VAGINALIS: POSITIVE
N GONORRHOEA DNA SPEC QL PROBE+SIG AMP: NEGATIVE
SOURCE: ABNORMAL
SPECIMEN DESCRIPTION: NORMAL
TRICHOMONAS: NEGATIVE

## 2023-08-15 RX ORDER — METRONIDAZOLE 500 MG/1
500 TABLET ORAL 2 TIMES DAILY
Qty: 14 TABLET | Refills: 0 | Status: SHIPPED | OUTPATIENT
Start: 2023-08-15 | End: 2023-08-22

## 2023-08-15 RX ORDER — FLUCONAZOLE 150 MG/1
TABLET ORAL
Qty: 2 TABLET | Refills: 0 | Status: SHIPPED | OUTPATIENT
Start: 2023-08-15

## 2023-08-23 RX ORDER — METRONIDAZOLE 500 MG/1
500 TABLET ORAL 2 TIMES DAILY
Qty: 14 TABLET | Refills: 0 | Status: SHIPPED | OUTPATIENT
Start: 2023-08-23 | End: 2023-08-30

## 2023-08-23 RX ORDER — FLUCONAZOLE 150 MG/1
TABLET ORAL
Qty: 2 TABLET | Refills: 0 | Status: SHIPPED | OUTPATIENT
Start: 2023-08-23

## 2023-08-23 NOTE — TELEPHONE ENCOUNTER
From: SANDRA Gutierres  Sent: 8/15/2023 1:30 PM EDT  Subject: vaginal culture    Good Afternoon Lenora Sharma,       Your recent vaginal culture was positive for bacterial vaginosis, and yeast. Bacterial vaginosis is just an overgrowth of normal preet found in the vaginal area, any time the [facility] balance is off. This is treated with Oral Flagyl 500 mg taken twice daily for 7 days. And Yeast is treated with Diflucan take 1 pill now, and 1 after completion of the Flagyl. Any questions please reach out.        Sanna Greenwood

## 2024-11-21 NOTE — ED NOTES
Patient ambulated to bathroom. Urine specimen collected and sent to lab. Writer called lab to draw blood specimen.       Dex Garber LPN  [id number] You can access the FollowMyHealth Patient Portal offered by [facility] by registering at the following website: [email or web address]. By joining [employer]’s FollowMyHealth portal, you will also be able to view your health information using other applications (apps) compatible with our system.